# Patient Record
Sex: FEMALE | Race: WHITE | NOT HISPANIC OR LATINO | Employment: PART TIME | URBAN - METROPOLITAN AREA
[De-identification: names, ages, dates, MRNs, and addresses within clinical notes are randomized per-mention and may not be internally consistent; named-entity substitution may affect disease eponyms.]

---

## 2023-03-08 LAB — SARS-COV-2 RESULT: NOT DETECTED

## 2023-04-13 ENCOUNTER — HOSPITAL ENCOUNTER (OUTPATIENT)
Dept: DATA CONVERSION | Facility: HOSPITAL | Age: 43
End: 2023-04-13
Attending: SURGERY | Admitting: SURGERY
Payer: COMMERCIAL

## 2023-04-13 DIAGNOSIS — N61.0 MASTITIS WITHOUT ABSCESS: ICD-10-CM

## 2023-04-13 DIAGNOSIS — G89.4 CHRONIC PAIN SYNDROME: ICD-10-CM

## 2023-04-13 DIAGNOSIS — K86.2 CYST OF PANCREAS (HHS-HCC): ICD-10-CM

## 2023-04-13 DIAGNOSIS — Z90.49 ACQUIRED ABSENCE OF OTHER SPECIFIED PARTS OF DIGESTIVE TRACT: ICD-10-CM

## 2023-04-13 DIAGNOSIS — Z88.2 ALLERGY STATUS TO SULFONAMIDES: ICD-10-CM

## 2023-04-13 DIAGNOSIS — Z90.89 ACQUIRED ABSENCE OF OTHER ORGANS: ICD-10-CM

## 2023-04-13 DIAGNOSIS — C50.812 MALIGNANT NEOPLASM OF OVERLAPPING SITES OF LEFT FEMALE BREAST (MULTI): ICD-10-CM

## 2023-04-13 DIAGNOSIS — F17.200 NICOTINE DEPENDENCE, UNSPECIFIED, UNCOMPLICATED: ICD-10-CM

## 2023-05-01 LAB
COMPLETE PATHOLOGY REPORT: NORMAL
CONVERTED CLINICAL DIAGNOSIS-HISTORY: NORMAL
CONVERTED FINAL DIAGNOSIS: NORMAL
CONVERTED FINAL REPORT PDF LINK TO COPY AND PASTE: NORMAL
CONVERTED GROSS DESCRIPTION: NORMAL

## 2023-09-07 VITALS — HEIGHT: 65 IN | BODY MASS INDEX: 20.94 KG/M2 | WEIGHT: 125.66 LBS

## 2023-09-14 NOTE — H&P
"    History & Physical Reviewed:   Pregnant/Lactating:  ·  Are You Pregnant no (1)   ·  Are You Currently Breastfeeding no (1)     I have reviewed the History and Physical dated:  04-Apr-2023   History and Physical reviewed and relevant findings noted. Patient examined to review pertinent physical  findings.: No significant changes   Home Medications Reviewed: no changes noted   Allergies Reviewed: no changes noted       ERAS (Enhanced Recovery After Surgery):  ·  ERAS Patient: no     Consent:   COVID-19 Consent:  ·  COVID-19 Risk Consent Surgeon has reviewed key risks related to the risk of ganga COVID-19 and if they contract COVID-19 what the risks are.       Electronic Signatures:  Hazel Garcia)  (Signed 13-Apr-2023 09:44)   Authored: History & Physical Reviewed, ERAS, Consent,  Note Completion      Last Updated: 13-Apr-2023 09:44 by Hazel Garcia (MD)    References:  1.  Data Referenced From \"Patient Profile - Preop v3\" 13-Apr-2023 08:18   "

## 2023-09-29 ENCOUNTER — HOSPITAL ENCOUNTER (OUTPATIENT)
Dept: RADIATION ONCOLOGY | Facility: CLINIC | Age: 43
Setting detail: RADIATION/ONCOLOGY SERIES
Discharge: STILL ADMITTED OR ADMINISTRATIVE | End: 2023-09-29
Payer: COMMERCIAL

## 2023-09-29 RX ORDER — HYDROCODONE BITARTRATE AND ACETAMINOPHEN 5; 325 MG/1; MG/1
1 TABLET ORAL 4 TIMES DAILY
COMMUNITY
Start: 2023-09-22

## 2023-09-29 RX ORDER — TAMOXIFEN CITRATE 20 MG/1
TABLET ORAL
COMMUNITY
Start: 2023-07-19 | End: 2024-02-16 | Stop reason: SINTOL

## 2023-09-29 RX ORDER — VARENICLINE TARTRATE 0.5 MG/1
0.5 TABLET, FILM COATED ORAL 2 TIMES DAILY
COMMUNITY
Start: 2023-09-22

## 2023-10-02 NOTE — OP NOTE
PROCEDURE DETAILS    Preoperative Diagnosis:  Positive anterior margins status post left mastectomy for breast cancer  Postoperative Diagnosis:  Positive anterior margins status post left mastectomy for breast cancer  Surgeon: Hazel Garcia  Resident/Fellow/Other Assistant: Boubacar Moreno    Procedure:  1. Re-excision of left breast mastectomy incision    Anesthesia: Duane Naranjo  Estimated Blood Loss: 5  Findings: Normal-appearing tissue.  Specimens(s) Collected: yes,  Lateral left mastectomy wound reexcision-long stitch lateral, short stitch superior   Complications: none  IV Fluids: 900  Patient Returned To/Condition: To PACU in stable condition        Operative Report:   Indication for the procedure: Pilar is a 43-year-old white female who had previously undergone bilateral mastectomy with left sentinel lymph node biopsy for  left invasive lobular carcinoma.  She was found on final pathology to have a positive anterior margin and the breast tumor board recommended a reexcision of the lateral half of the left mastectomy wound.  The benefits and risk of this were reviewed with  the patient preoperatively.  Consent was signed.    Details of the procedure: The patient was brought to the operating room and was laid in the supine position.  After placement under general anesthesia, TONIE hose and SCDs were placed on bilateral lower extremities.  The left chest wall then was prepped  with ChloraPrep and draped in usual sterile fashion.  A pause was taken the correct patient procedure were identified.    Markings for the excision were made.  Starting just over intermediate on the mastectomy wound, approximately 2.5 cm inferiorly and 1 cm superiorly of the lateral aspect of the left mastectomy wound was marked for reexcision.  This was extended slightly beyond  the previous mastectomy wound laterally.  The incision was opened up using a 10 blade.  Using cautery, the skin and subcutaneous tissue was dissected off all  the way down to the pectoralis muscle.  The specimen was marked with a long stitch laterally  and a short stitch superiorly.  In order to close the wound, undermining was performed both inferiorly and superiorly.  0 Vicryl suture was used to reapproximate the subcutaneous tissue.  It was necessary to swing the left arm next to her body in order  to close the central aspect.  A running subcuticular stitch of 4-0 Vicryl suture then was used to reapproximate the skin.  The wound then was dressed with Steri-Strips, ABD pad and an Ace wrap.  The patient tolerated the procedure well.  She then was  awoken from general anesthesia and taken to the recovery room in stable condition.    Counts: Correct x2  Drains: None.  Complications: None.                        Attestation:   Note Completion:  Attending Attestation I was present for the entire procedure         Electronic Signatures:  Hazel Garcia)  (Signed 13-Apr-2023 11:51)   Authored: Post-Operative Note, Chart Review, Note Completion      Last Updated: 13-Apr-2023 11:51 by Hazel Garcia)

## 2023-10-03 ENCOUNTER — APPOINTMENT (OUTPATIENT)
Dept: RADIATION ONCOLOGY | Facility: CLINIC | Age: 43
End: 2023-10-03

## 2023-10-03 ENCOUNTER — HOSPITAL ENCOUNTER (OUTPATIENT)
Dept: RADIATION ONCOLOGY | Facility: CLINIC | Age: 43
Setting detail: RADIATION/ONCOLOGY SERIES
Discharge: STILL ADMITTED OR ADMINISTRATIVE | End: 2023-10-03
Payer: COMMERCIAL

## 2023-10-03 DIAGNOSIS — Z51.0 ENCOUNTER FOR ANTINEOPLASTIC RADIATION THERAPY: ICD-10-CM

## 2023-10-03 DIAGNOSIS — C50.812 MALIGNANT NEOPLASM OF OVERLAPPING SITES OF LEFT FEMALE BREAST (MULTI): ICD-10-CM

## 2023-10-03 LAB
RAD ONC MSQ ACTUAL FRACTIONS DELIVERED: 15
RAD ONC MSQ ACTUAL FRACTIONS DELIVERED: 15
RAD ONC MSQ ACTUAL SESSION DELIVERED DOSE: 266 CGRAY
RAD ONC MSQ ACTUAL SESSION DELIVERED DOSE: 266 CGRAY
RAD ONC MSQ ACTUAL TOTAL DOSE: 3990 CGRAY
RAD ONC MSQ ACTUAL TOTAL DOSE: 3990 CGRAY
RAD ONC MSQ ELAPSED DAYS: 21
RAD ONC MSQ ELAPSED DAYS: 21
RAD ONC MSQ LAST DATE: NORMAL
RAD ONC MSQ LAST DATE: NORMAL
RAD ONC MSQ PRESCRIBED FRACTIONAL DOSE: 266 CGRAY
RAD ONC MSQ PRESCRIBED FRACTIONAL DOSE: 266 CGRAY
RAD ONC MSQ PRESCRIBED NUMBER OF FRACTIONS: 16
RAD ONC MSQ PRESCRIBED NUMBER OF FRACTIONS: 16
RAD ONC MSQ PRESCRIBED TECHNIQUE: NORMAL
RAD ONC MSQ PRESCRIBED TECHNIQUE: NORMAL
RAD ONC MSQ PRESCRIBED TOTAL DOSE: 4256 CGRAY
RAD ONC MSQ PRESCRIBED TOTAL DOSE: 4256 CGRAY
RAD ONC MSQ PRESCRIPTION PATTERN COMMENT: NORMAL
RAD ONC MSQ PRESCRIPTION PATTERN COMMENT: NORMAL
RAD ONC MSQ START DATE: NORMAL
RAD ONC MSQ START DATE: NORMAL
RAD ONC MSQ TREATMENT COURSE NUMBER: 1
RAD ONC MSQ TREATMENT COURSE NUMBER: 1
RAD ONC MSQ TREATMENT SITE: NORMAL
RAD ONC MSQ TREATMENT SITE: NORMAL

## 2023-10-03 PROCEDURE — 77387 GUIDANCE FOR RADJ TX DLVR: CPT | Performed by: STUDENT IN AN ORGANIZED HEALTH CARE EDUCATION/TRAINING PROGRAM

## 2023-10-03 PROCEDURE — 77412 RADIATION TX DELIVERY LVL 3: CPT | Performed by: STUDENT IN AN ORGANIZED HEALTH CARE EDUCATION/TRAINING PROGRAM

## 2023-10-03 PROCEDURE — 77014 CHG CT GUIDANCE RADIATION THERAPY FLDS PLACEMENT: CPT | Performed by: STUDENT IN AN ORGANIZED HEALTH CARE EDUCATION/TRAINING PROGRAM

## 2023-10-04 ENCOUNTER — HOSPITAL ENCOUNTER (OUTPATIENT)
Dept: RADIATION ONCOLOGY | Facility: CLINIC | Age: 43
Setting detail: RADIATION/ONCOLOGY SERIES
Discharge: STILL ADMITTED OR ADMINISTRATIVE | End: 2023-10-04
Payer: COMMERCIAL

## 2023-10-04 DIAGNOSIS — Z51.0 ENCOUNTER FOR ANTINEOPLASTIC RADIATION THERAPY: ICD-10-CM

## 2023-10-04 DIAGNOSIS — C50.812 MALIGNANT NEOPLASM OF OVERLAPPING SITES OF LEFT FEMALE BREAST (MULTI): ICD-10-CM

## 2023-10-04 LAB
RAD ONC MSQ ACTUAL FRACTIONS DELIVERED: 16
RAD ONC MSQ ACTUAL FRACTIONS DELIVERED: 16
RAD ONC MSQ ACTUAL SESSION DELIVERED DOSE: 266 CGRAY
RAD ONC MSQ ACTUAL SESSION DELIVERED DOSE: 266 CGRAY
RAD ONC MSQ ACTUAL TOTAL DOSE: 4256 CGRAY
RAD ONC MSQ ACTUAL TOTAL DOSE: 4256 CGRAY
RAD ONC MSQ ELAPSED DAYS: 22
RAD ONC MSQ ELAPSED DAYS: 22
RAD ONC MSQ LAST DATE: NORMAL
RAD ONC MSQ LAST DATE: NORMAL
RAD ONC MSQ PRESCRIBED FRACTIONAL DOSE: 266 CGRAY
RAD ONC MSQ PRESCRIBED FRACTIONAL DOSE: 266 CGRAY
RAD ONC MSQ PRESCRIBED NUMBER OF FRACTIONS: 16
RAD ONC MSQ PRESCRIBED NUMBER OF FRACTIONS: 16
RAD ONC MSQ PRESCRIBED TECHNIQUE: NORMAL
RAD ONC MSQ PRESCRIBED TECHNIQUE: NORMAL
RAD ONC MSQ PRESCRIBED TOTAL DOSE: 4256 CGRAY
RAD ONC MSQ PRESCRIBED TOTAL DOSE: 4256 CGRAY
RAD ONC MSQ PRESCRIPTION PATTERN COMMENT: NORMAL
RAD ONC MSQ PRESCRIPTION PATTERN COMMENT: NORMAL
RAD ONC MSQ START DATE: NORMAL
RAD ONC MSQ START DATE: NORMAL
RAD ONC MSQ TREATMENT COURSE NUMBER: 1
RAD ONC MSQ TREATMENT COURSE NUMBER: 1
RAD ONC MSQ TREATMENT SITE: NORMAL
RAD ONC MSQ TREATMENT SITE: NORMAL

## 2023-10-04 PROCEDURE — 77412 RADIATION TX DELIVERY LVL 3: CPT | Performed by: STUDENT IN AN ORGANIZED HEALTH CARE EDUCATION/TRAINING PROGRAM

## 2023-10-04 PROCEDURE — 77014 CHG CT GUIDANCE RADIATION THERAPY FLDS PLACEMENT: CPT | Performed by: STUDENT IN AN ORGANIZED HEALTH CARE EDUCATION/TRAINING PROGRAM

## 2023-10-04 PROCEDURE — 77387 GUIDANCE FOR RADJ TX DLVR: CPT | Performed by: STUDENT IN AN ORGANIZED HEALTH CARE EDUCATION/TRAINING PROGRAM

## 2023-10-16 ENCOUNTER — DOCUMENTATION (OUTPATIENT)
Dept: RADIATION ONCOLOGY | Facility: CLINIC | Age: 43
End: 2023-10-16
Payer: COMMERCIAL

## 2023-10-24 DIAGNOSIS — C50.912 MALIGNANT NEOPLASM OF LEFT BREAST IN FEMALE, ESTROGEN RECEPTOR POSITIVE, UNSPECIFIED SITE OF BREAST (MULTI): ICD-10-CM

## 2023-10-24 DIAGNOSIS — Z17.0 MALIGNANT NEOPLASM OF LEFT BREAST IN FEMALE, ESTROGEN RECEPTOR POSITIVE, UNSPECIFIED SITE OF BREAST (MULTI): ICD-10-CM

## 2023-10-25 ENCOUNTER — OFFICE VISIT (OUTPATIENT)
Dept: HEMATOLOGY/ONCOLOGY | Facility: CLINIC | Age: 43
End: 2023-10-25
Payer: COMMERCIAL

## 2023-10-25 ENCOUNTER — LAB (OUTPATIENT)
Dept: LAB | Facility: HOSPITAL | Age: 43
End: 2023-10-25
Payer: COMMERCIAL

## 2023-10-25 VITALS
HEART RATE: 76 BPM | HEIGHT: 64 IN | SYSTOLIC BLOOD PRESSURE: 116 MMHG | OXYGEN SATURATION: 97 % | TEMPERATURE: 98.4 F | BODY MASS INDEX: 21.91 KG/M2 | WEIGHT: 128.35 LBS | DIASTOLIC BLOOD PRESSURE: 74 MMHG | RESPIRATION RATE: 16 BRPM

## 2023-10-25 DIAGNOSIS — C50.912 MALIGNANT NEOPLASM OF LEFT BREAST IN FEMALE, ESTROGEN RECEPTOR POSITIVE, UNSPECIFIED SITE OF BREAST (MULTI): ICD-10-CM

## 2023-10-25 DIAGNOSIS — Z17.0 MALIGNANT NEOPLASM OF LEFT BREAST IN FEMALE, ESTROGEN RECEPTOR POSITIVE, UNSPECIFIED SITE OF BREAST (MULTI): ICD-10-CM

## 2023-10-25 DIAGNOSIS — C50.919 INVASIVE LOBULAR CARCINOMA OF BREAST IN FEMALE (MULTI): ICD-10-CM

## 2023-10-25 LAB
ALBUMIN SERPL BCP-MCNC: 4.2 G/DL (ref 3.4–5)
ALP SERPL-CCNC: 75 U/L (ref 33–110)
ALT SERPL W P-5'-P-CCNC: 25 U/L (ref 7–45)
ANION GAP SERPL CALC-SCNC: 12 MMOL/L (ref 10–20)
AST SERPL W P-5'-P-CCNC: 18 U/L (ref 9–39)
BASOPHILS # BLD AUTO: 0.01 X10*3/UL (ref 0–0.1)
BASOPHILS NFR BLD AUTO: 0.2 %
BILIRUB SERPL-MCNC: 0.4 MG/DL (ref 0–1.2)
BUN SERPL-MCNC: 11 MG/DL (ref 6–23)
CALCIUM SERPL-MCNC: 9.5 MG/DL (ref 8.6–10.3)
CHLORIDE SERPL-SCNC: 100 MMOL/L (ref 98–107)
CO2 SERPL-SCNC: 29 MMOL/L (ref 21–32)
CREAT SERPL-MCNC: 0.74 MG/DL (ref 0.5–1.05)
EOSINOPHIL # BLD AUTO: 0.24 X10*3/UL (ref 0–0.7)
EOSINOPHIL NFR BLD AUTO: 5.3 %
ERYTHROCYTE [DISTWIDTH] IN BLOOD BY AUTOMATED COUNT: 11.5 % (ref 11.5–14.5)
GFR SERPL CREATININE-BSD FRML MDRD: >90 ML/MIN/1.73M*2
GLUCOSE SERPL-MCNC: 77 MG/DL (ref 74–99)
HCT VFR BLD AUTO: 42.3 % (ref 36–46)
HGB BLD-MCNC: 14 G/DL (ref 12–16)
IMM GRANULOCYTES # BLD AUTO: 0.01 X10*3/UL (ref 0–0.7)
IMM GRANULOCYTES NFR BLD AUTO: 0.2 % (ref 0–0.9)
LYMPHOCYTES # BLD AUTO: 0.64 X10*3/UL (ref 1.2–4.8)
LYMPHOCYTES NFR BLD AUTO: 14.1 %
MCH RBC QN AUTO: 28.9 PG (ref 26–34)
MCHC RBC AUTO-ENTMCNC: 33.1 G/DL (ref 32–36)
MCV RBC AUTO: 87 FL (ref 80–100)
MONOCYTES # BLD AUTO: 0.5 X10*3/UL (ref 0.1–1)
MONOCYTES NFR BLD AUTO: 11 %
NEUTROPHILS # BLD AUTO: 3.13 X10*3/UL (ref 1.2–7.7)
NEUTROPHILS NFR BLD AUTO: 69.2 %
PLATELET # BLD AUTO: 209 X10*3/UL (ref 150–450)
PMV BLD AUTO: 8.7 FL (ref 7.5–11.5)
POTASSIUM SERPL-SCNC: 3.7 MMOL/L (ref 3.5–5.3)
PROT SERPL-MCNC: 6.5 G/DL (ref 6.4–8.2)
RBC # BLD AUTO: 4.85 X10*6/UL (ref 4–5.2)
SODIUM SERPL-SCNC: 137 MMOL/L (ref 136–145)
WBC # BLD AUTO: 4.5 X10*3/UL (ref 4.4–11.3)

## 2023-10-25 PROCEDURE — 80053 COMPREHEN METABOLIC PANEL: CPT

## 2023-10-25 PROCEDURE — 99213 OFFICE O/P EST LOW 20 MIN: CPT | Performed by: INTERNAL MEDICINE

## 2023-10-25 PROCEDURE — 36415 COLL VENOUS BLD VENIPUNCTURE: CPT

## 2023-10-25 PROCEDURE — 1036F TOBACCO NON-USER: CPT | Performed by: INTERNAL MEDICINE

## 2023-10-25 PROCEDURE — 85025 COMPLETE CBC W/AUTO DIFF WBC: CPT

## 2023-10-25 ASSESSMENT — PATIENT HEALTH QUESTIONNAIRE - PHQ9
1. LITTLE INTEREST OR PLEASURE IN DOING THINGS: NOT AT ALL
2. FEELING DOWN, DEPRESSED OR HOPELESS: NOT AT ALL
SUM OF ALL RESPONSES TO PHQ9 QUESTIONS 1 AND 2: 0

## 2023-10-25 ASSESSMENT — PAIN SCALES - GENERAL: PAINLEVEL: 2

## 2023-10-25 NOTE — PROGRESS NOTES
Patient Visit Information:   Visit Type: Follow Up Visit      Cancer History:          Breast         AJCC Edition: 8th (AJCC), Diagnosis Date: Mar 2023, IB, pT3 pN0(i+) cM0 G2     Treatment Synopsis:    4/13/23, reexcision, pathology pending  ast, multifocal, maximum size 65 mm, ER 95%, CO 95%, HER2 negative, associated with lobular carcinoma in situ, posterior  anterior margin positive, status post reexcision, 1 sentinel lymph node positive for micrometastatic disease      Stage Ib, pT3, pN1(omar)Mo, MammaPrint low risk  Oncotype DX score 13     Adjuvant chemotherapy, Cytoxan and Taxotere x4     5/17/23 , c1  6/7/23  ,c2  6/28/23 , c3  7/19/23 ,c4  Status post adjuvant radiotherapy to the left chest wall    Tamoxifen 20 mg p.o. daily, started on 10/25/23     Right mastectomy, no malignancy  BRCA 1 and 2 negative               October 2022, she felt a 1 cm mass in the upper outer quadrant of the left breast. She had a mammogram which was not able to  identify the mass. She had a follow-up ultrasound showing a 1.3 cm mass of the left breast of the upper outer quadrant and a slightly  thickened axillary lymph node measuring 5 mm of cortex. She subsequently underwent an ultrasound-guided biopsy which demonstrated invasive ductal carcinoma, grade 2 which was ER/CO positive.      Given her young age, she subsequently underwent a breast MRI which demonstrated 3 masses in the left breast. A follow-up biopsy of the upper inner quadrant left breast mass   measuring 1 cm showed invasive lobular carcinoma, grade 2 which was ER/CO positive. The third breast mass on the left at the  inferior aspect could not be seen by ultrasound on Second Look evaluation. This mass could only be seen by MRI, and subsequently has not undergone a biopsy of the mass #3. She did undergo genetic testing and reports that she was BRCA negative.  Patient was evaluated by Dr. Klaus Garcia for second opinion .     3/10/23, left simple mastectomy,  left sentinel lymph node dissection and right mastectomy      Pathology of left mastectomy did show invasive lobular carcinoma, maximum size 65 mm, multifocal and as a invasive lobular carcinoma measuring 20 mm, 6 mm, 5 mm, 4 mm, 3 mm.  1 sentinel lymph node did show micrometastatic disease, associated with lobular  carcinoma in situ, anterior and posterior margin positive      4/13/23, reexcision, pathology pending                History of Present Illness:      ID Statement:    SHYANN ONOFRE is a 43 year old Female        Chief Complaint: Invasive lobular carcinoma left  breast, multifocal   Interval History:    Patient is 43-year-old premenopausal, she evaluated for invasive lobular carcinoma left breast status post bilateral mastectomy.  Tumor measuring 65 mm, pT3, 1 lymph  node positive for micrometastatic disease, ER positive NH positive, grade 2.            October 2022, she felt a 1 cm mass in the upper outer quadrant of the left breast. She had a mammogram which was not able to  identify the mass. She had a follow-up ultrasound showing a 1.3 cm mass of the left breast of the upper outer quadrant and a slightly  thickened axillary lymph node measuring 5 mm of cortex. She subsequently underwent an ultrasound-guided biopsy which demonstrated invasive ductal carcinoma, grade 2 which was ER/NH positive.      Given her young age, she subsequently underwent a breast MRI which demonstrated 3 masses in the left breast. A follow-up biopsy of the upper inner quadrant left breast mass   measuring 1 cm showed invasive lobular carcinoma, grade 2 which was ER/NH positive. The third breast mass on the left at the  inferior aspect could not be seen by ultrasound on Second Look evaluation. This mass could only be seen by MRI, and subsequently has not undergone a biopsy of the mass #3. She did undergo genetic testing and reports that she was BRCA negative.  Patient was evaluated by Dr. Klaus Garcia for second opinion .      3/10/23, left simple mastectomy, left sentinel lymph node dissection and right mastectomy      Pathology of left mastectomy did show invasive lobular carcinoma, maximum size 65 mm, multifocal and as a invasive lobular carcinoma measuring 20 mm, 6 mm, 5 mm, 4 mm, 3 mm.  1 sentinel lymph node did show micrometastatic disease, associated with lobular  carcinoma in situ, anterior and posterior margin positive      4/13/23, reexcision, pathology pending      Medical oncology consultation is requested for systemic treatment      Patient is very anxious, no headache, no bony pain, no weight loss still very active, complaining left chest wall pain.  Status post partial hysterectomy patient still has ovaries      Pathology report and possible prognostic factor including tumor size, ER/MD status, HER2/elisabeth status discussed with the patient.     7/19/23     Patient has a history of left breast cancer, status post bilateral mastectomy, BRCA1 negative, low MammaPrint, Oncotype DX score 13 and lab discussed with the patient.  Patient is very anxious no any other complaint.     Patient receiving adjuvant chemotherapy including Cytoxan and Taxotere which was started      10/25/23  Patient has a history of left breast cancer undergo bilateral mastectomy status post  TC x 4 and status post radiotherapy.  Patient is complaining left chest wall bony pain, energy level is coming back something patient is feeling numbness tingling lower extremity.  So far patient did not start tamoxifen she is going to start tamoxifen today.         Review of Systems:   Review of Systems:    Patient is anxious, no headache, no dizziness, no bony pain, no chest pain and shortness of breath, left chest wall pain, no nausea vomiting, no abdominal pain, status  post partial hysterectomy, patient still has ovaries        Allergies and Intolerances:       Allergies:         Phenergan: Drug, Unknown, Active         sulfa drugs: Drug Category, Unknown, Active      Outpatient Medication Profile:  * Patient Currently Takes Medications as of 2023 09:01 documented in Structured Notes         nystatin 100,000 units/mL oral suspension : 4 milliliter(s) orally 4 times a day  Swish in mouth for several seconds and swallow , Start Date: 2023         ondansetron 8 mg oral tablet, disintegratin tab(s) orally every 6  hours, As Needed  -for nausea and vomiting , Start Date: 2023         Diflucan 100 mg oral tablet: 1 tab(s) orally once a day x 7 days , Start  Date: 2023         dexAMETHasone 4 mg oral tablet: 2 tab(s) orally 2 times a day the day  prior and the 2 days after each chemotherpay treatment , Start Date: 04-May-2023         Norco 325 mg-5 mg oral tablet: 1 cap(s) oral prn         Tums 500 mg oral tablet, chewable: 1 cap(s) oral prn         Motrin: 1 cap(s) oral prn         Chantix: 1 cap(s) oral once a day         Vitamin C 1000 mg oral tablet: 1 tab(s) orally once a day         multivitamin:         Family History: No Family History items are recorded  in the problem list.      Social History:   Social Substance History:  ·  Smoking Status former smoker (1)   ·  Drug Use unknown            Vitals and Measurements:   Vitals: Temp: 36.8  HR: 79  RR: 16  BP: 122/80  SPO2%:   98   Measurements: HT(cm): 162.7  WT(kg): 57.8  BSA: 1.61   BMI:  21.8   Last 3 Weights & Heights: Date:                           Weight/Scale Type:                    Height:   2023 12:12                57.8  kg                     162.7  cm  2023 12:10                58.7  kg                     162.7  cm  2023 12:13                57.5  kg                     162.7  cm      Physical Exam:      Constitutional: awake/alert/oriented x3, no distress,   Head/Neck: Neck supple, no apparent injury, thyroid  without mass or tenderness, No JVD, trachea midline, no bruits   Respiratory/Thorax: Patent airways, CTAB, normal  breath sounds   Cardiovascular:  Regular, rate and rhythm,  normal  S 1and S 2   Gastrointestinal: Nondistended, soft, non-tender,  no rebound tenderness or guarding, no masses palpable, no organomegaly, +BS,   Musculoskeletal: ROM intact, no joint swelling,   Extremities: normal extremities, no cyanosis edema,  contusions or wounds, no clubbing   Neurological: alert and oriented x3, intact senses,  motor, response and reflexes, normal strength   Breast: Bilateral   mastectomy, scar is healing   Lymphatic: No significant lymphadenopathy   Psychological: Appropriate mood and behavior   Skin: Warm and dry, no lesions, no rashes         Lab Results:                3.8             104             N/A             12              0.68     Assessment and Plan:      Assessment and Plan:   Assessment:    1.  Invasive lobular carcinoma left breast, multifocal, maximum size 65 mm, ER 95%, CT 95%, HER2 negative, associated with lobular carcinoma in situ, posterior anterior  margin positive, status post reexcision, 1 sentinel lymph node positive for micrometastatic disease      Stage Ib, pT3, pN1(omar)Mo, MammaPrint low risk , Oncotype DX score 13     Right mastectomy, no malignancy  BRCA 1 and 2 negative     Treatment, Cytoxan, Taxotere      C1, 5/17/23  C2 , 6/07/23   C3 ,6/28/23 7/19/23 , C4  Status post radiotherapy         October 2022, she felt a 1 cm mass in the upper outer quadrant of the left breast. She had a mammogram which was not able to  identify the mass. She had a follow-up ultrasound showing a 1.3 cm mass of the left breast of the upper outer quadrant and a slightly  thickened axillary lymph node measuring 5 mm of cortex. She subsequently underwent an ultrasound-guided biopsy which demonstrated invasive ductal carcinoma, grade 2 which was ER/CT positive.      Given her young age, she subsequently underwent a breast MRI which demonstrated 3 masses in the left breast. A follow-up biopsy of the upper inner quadrant left breast mass   measuring 1  cm showed invasive lobular carcinoma, grade 2 which was ER/MS positive. The third breast mass on the left at the  inferior aspect could not be seen by ultrasound on Second Look evaluation. This mass could only be seen by MRI, and subsequently has not undergone a biopsy of the mass #3. She did undergo genetic testing and reports that she was BRCA negative.  Patient was evaluated by Dr. Klaus Garcia for second opinion .     3/10/23, left simple mastectomy, left sentinel lymph node dissection and right mastectomy      Pathology of left mastectomy did show invasive lobular carcinoma, maximum size 65 mm, multifocal and as a invasive lobular carcinoma measuring 20 mm, 6 mm, 5 mm, 4 mm, 3 mm.  1 sentinel lymph node did show micrometastatic disease, associated with lobular  carcinoma in situ, anterior and posterior margin positive      4/13/23, reexcision, pathology pending         Pathology and possible prognostic factors are discussed with the patient.  Patient has stage Ib invasive lobular carcinoma left breast, multifocal, measuring 65 mm ER positive, MS positive, HER2 negative, 1 lymph node positive for micrometastatic disease.  Patient has T3 disease ,  will be treated with systemic chemotherapy including Cytoxan and Taxotere, even though MammaPrint is low I will also check Oncotype DX study to make sure patient does not need doxorubicin based chemotherapy.  After chemotherapy  patient will be treated with adjuvant radiotherapy followed by tamoxifen 20 mg p.o. daily.      10/25/23     Patient has a history of left   breast cancer T3 N1 M0, MammaPrint low risk, Oncotype DX 13 status post 3 doses of adjuvant chemotherapy including Cytoxan and Taxotere which is well-tolerated.   Patient also has received adjuvant radiotherapy was finished 2 weeks ago.  Patient did not started tamoxifen yet when to start tamoxifen 20 mg p.o. daily today.    Possible side effect discussed with the patient.    Follow-up after 2-month,  as the patient has persistent left rib pain I will schedule for bone scan.     Time spent 30 minutes

## 2023-10-31 NOTE — RADIATION COMPLETION NOTES
Radiation Oncology Treatment Summary    Patient Name:  Pilar Khan  MRN:  [id number]  :  1980    Referring Provider: No ref. provider found  Primary Care Provider: Jv Garrett MD    Brief History: Pilar Khan is a 43 y.o. female with No matching staging information was found for the patient..  The patient completed radiotherapy as outlined below.    Radiation Treatment Summary:    Radiation Treatments       Lt SCV/Ailla     42.56 Gy / 16 fx 3D     2023 - 10/04/2023        Lt CW/IMNs    42.56 Gy / 16 fx 3D     2023 - 10/04/2023       Concurrent Chemotherapy:  Treatment Plans       Patient tolerated treatment well with anticipated side effects. During the course of treatment, she developed erythema to the treatment area, no moist desquamation and mild sore throat          CTCAE Toxicity Overview:     Patient Disposition: Patient will be seen for follow up on 2023. She knows to call with any questions or concerns.

## 2023-11-07 ENCOUNTER — APPOINTMENT (OUTPATIENT)
Dept: RADIATION ONCOLOGY | Facility: CLINIC | Age: 43
End: 2023-11-07
Payer: COMMERCIAL

## 2023-11-09 ENCOUNTER — APPOINTMENT (OUTPATIENT)
Dept: RADIATION ONCOLOGY | Facility: CLINIC | Age: 43
End: 2023-11-09
Payer: COMMERCIAL

## 2023-11-17 ENCOUNTER — HOSPITAL ENCOUNTER (OUTPATIENT)
Dept: RADIATION ONCOLOGY | Facility: CLINIC | Age: 43
Setting detail: RADIATION/ONCOLOGY SERIES
Discharge: HOME | End: 2023-11-17
Payer: COMMERCIAL

## 2023-11-17 VITALS
WEIGHT: 123.5 LBS | DIASTOLIC BLOOD PRESSURE: 81 MMHG | SYSTOLIC BLOOD PRESSURE: 118 MMHG | OXYGEN SATURATION: 98 % | TEMPERATURE: 97.5 F | BODY MASS INDEX: 21.19 KG/M2 | RESPIRATION RATE: 18 BRPM | HEART RATE: 82 BPM

## 2023-11-17 DIAGNOSIS — Z17.0 MALIGNANT NEOPLASM OF OVERLAPPING SITES OF LEFT BREAST IN FEMALE, ESTROGEN RECEPTOR POSITIVE (MULTI): Primary | ICD-10-CM

## 2023-11-17 DIAGNOSIS — C50.812 MALIGNANT NEOPLASM OF OVERLAPPING SITES OF LEFT BREAST IN FEMALE, ESTROGEN RECEPTOR POSITIVE (MULTI): Primary | ICD-10-CM

## 2023-11-17 PROCEDURE — 99214 OFFICE O/P EST MOD 30 MIN: CPT | Performed by: STUDENT IN AN ORGANIZED HEALTH CARE EDUCATION/TRAINING PROGRAM

## 2023-11-17 ASSESSMENT — COLUMBIA-SUICIDE SEVERITY RATING SCALE - C-SSRS
1. IN THE PAST MONTH, HAVE YOU WISHED YOU WERE DEAD OR WISHED YOU COULD GO TO SLEEP AND NOT WAKE UP?: NO
2. HAVE YOU ACTUALLY HAD ANY THOUGHTS OF KILLING YOURSELF?: NO
6. HAVE YOU EVER DONE ANYTHING, STARTED TO DO ANYTHING, OR PREPARED TO DO ANYTHING TO END YOUR LIFE?: NO

## 2023-11-17 ASSESSMENT — ENCOUNTER SYMPTOMS
DEPRESSION: 0
LOSS OF SENSATION IN FEET: 0
OCCASIONAL FEELINGS OF UNSTEADINESS: 0

## 2023-11-17 ASSESSMENT — LIFESTYLE VARIABLES
SKIP TO QUESTIONS 9-10: 1
HOW OFTEN DO YOU HAVE SIX OR MORE DRINKS ON ONE OCCASION: NEVER
AUDIT-C TOTAL SCORE: 0
HOW MANY STANDARD DRINKS CONTAINING ALCOHOL DO YOU HAVE ON A TYPICAL DAY: PATIENT DOES NOT DRINK
HOW OFTEN DO YOU HAVE A DRINK CONTAINING ALCOHOL: NEVER

## 2023-11-17 ASSESSMENT — PATIENT HEALTH QUESTIONNAIRE - PHQ9
2. FEELING DOWN, DEPRESSED OR HOPELESS: NOT AT ALL
1. LITTLE INTEREST OR PLEASURE IN DOING THINGS: NOT AT ALL
SUM OF ALL RESPONSES TO PHQ9 QUESTIONS 1 AND 2: 0

## 2023-11-17 ASSESSMENT — PAIN SCALES - GENERAL: PAINLEVEL: 2

## 2023-11-17 NOTE — PROGRESS NOTES
Radiation Oncology Follow-Up    Patient Name:  Pilar Khan  MRN:  [id number]  :  1980    Referring Provider: No ref. provider found  Primary Care Provider: Jv Garrett MD  Care Team: Patient Care Team:  Jv Garrett MD as PCP - General  Shirley Deleon MD as Consulting Physician (Hematology and Oncology)    Date of Service: 2023     SUBJECTIVE  History of Present Illness:   Pilar Khan is a 43 y.o. female who was previously seen at the [facility] Department of Radiation Oncology for anatomic stage IIIA prognostic stage IB mpT3 pN1mi cM0 left breast invasive lobular carcinoma (grade 2, ER+/OK+/HER2-, Oncotype 13). S/p left mastectomy/SLNB with positive margin and right prophylactic mastectomy on 3/9/23, then left re-excision 23 with no residual disease. She received adjuvant TC x4 completed 23. She received left PMRT 42.56 Gy/16 fractions completed 10/4/23.    Today, she returns for post-treatment follow up. She had skin desquamation over the left chest that has now healed and she denies skin irritation. She is having chest wall pain just inferior to the left mastectomy incision, which she has been dealing with since before she started adjuvant chemo. The pain bothers her more at night and only occasionally during the day with certain arm positions. She denies headache, vision changes, or focal weakness. She has neuropathy in her feet. She is having some intermittent nausea, some fatigue that is improving, and low appetite. She has been working part time at a grocery store, completes all ADLs at home, and is taking care of her 3 kids. She has tamoxifen but has not started taking it yet.    Treatment Rendered:   Radiation Treatments       Active   No active radiation treatments to show.     Historical   Lt CW/IMNs (Started on 2023)   Most recent fraction: 266 cGy given on 10/4/2023   Total given: 4,256 cGy / 4,256 cGy  (16 of 16 fractions)    Elapsed Days: 22   Technique: 3D        Lt SCV/Axilla (Started on 9/12/2023)   Most recent fraction: 266 cGy given on 10/4/2023   Total given: 4,256 cGy / 4,256 cGy  (16 of 16 fractions)   Elapsed Days: 22   Technique: 3D                     Review of Systems:   Review of Systems - Oncology    Performance Status:   The Karnofsky performance scale today is 90, Able to carry on normal activity; minor signs or symptoms of disease (ECOG equivalent 0).        OBJECTIVE  Vital Signs:  /81 (BP Location: Right arm, Patient Position: Sitting, BP Cuff Size: Adult)   Pulse 82   Temp 36.4 °C (97.5 °F) (Temporal)   Resp 18   Wt 56 kg (123 lb 8 oz)   SpO2 98%   BMI 21.19 kg/m²    Physical Exam:  Physical Exam  Vitals reviewed.   Constitutional:       General: She is not in acute distress.     Appearance: Normal appearance.   HENT:      Head: Normocephalic and atraumatic.      Mouth/Throat:      Mouth: Mucous membranes are moist.      Pharynx: Oropharynx is clear. No oropharyngeal exudate or posterior oropharyngeal erythema.   Eyes:      General: No scleral icterus.     Extraocular Movements: Extraocular movements intact.      Conjunctiva/sclera: Conjunctivae normal.      Pupils: Pupils are equal, round, and reactive to light.   Pulmonary:      Effort: Pulmonary effort is normal. No respiratory distress.   Chest:   Breasts:     Right: Absent. No swelling, bleeding, inverted nipple, mass, nipple discharge, skin change or tenderness.      Left: Tenderness present. No swelling, bleeding, inverted nipple, mass, nipple discharge or skin change.      Comments: Bilateral mastectomy incisions intact and well-healed. No suspicious chest wall nodules or masses bilaterally. There are two areas of tenderness inferior to the left mastectomy incision, one more medially and one more laterally. There are no associated masses or skin lesions.  Musculoskeletal:         General: Normal range of motion.      Cervical back: Normal range  of motion and neck supple.      Right lower leg: No edema.      Left lower leg: No edema.   Lymphadenopathy:      Upper Body:      Right upper body: No supraclavicular, axillary or pectoral adenopathy.      Left upper body: No supraclavicular, axillary or pectoral adenopathy.   Skin:     General: Skin is warm and dry.      Coloration: Skin is not jaundiced.      Findings: No lesion or rash.   Neurological:      General: No focal deficit present.      Mental Status: She is alert and oriented to person, place, and time.      Cranial Nerves: No cranial nerve deficit.      Sensory: No sensory deficit.      Motor: No weakness.      Coordination: Coordination normal.      Gait: Gait normal.   Psychiatric:         Mood and Affect: Mood normal.         Behavior: Behavior normal.             ASSESSMENT:  Pilar Khan is a 43 y.o. female with anatomic stage IIIA prognostic stage IB mpT3 pN1mi cM0 left breast invasive lobular carcinoma (grade 2, ER+/ND+/HER2-, Oncotype 13). S/p left mastectomy/SLNB with positive margin and right prophylactic mastectomy on 3/9/23, then left re-excision 4/13/23 with no residual disease. She received adjuvant TC x4 completed 7/19/23. She received left PMRT 42.56 Gy/16 fractions completed 10/4/23.    She is recovering well from her acute radiation side effects, and she maintains excellent performance status (ECOG 0). She is working part time and taking care of her children at home. She is still dealing with some fatigue, neuropathy in her feet, lower appetite, and some left chest wall pain that has persisted since after her surgeries. She has not yet started tamoxifen. She has no evidence of disease on exam today.    She is going to follow up with medical oncology next month and will see her surgeon in 3/2024. I will see her for follow up in about 6 months. I will refer her to occupational therapy for her chronic mastectomy-related chest wall pain.     NCCN Guidelines were applicable to guide  this patients treatment plan.    Aaron Dye MD

## 2023-12-10 DIAGNOSIS — C50.919 INVASIVE LOBULAR CARCINOMA OF BREAST IN FEMALE (MULTI): Primary | ICD-10-CM

## 2023-12-11 ENCOUNTER — LAB (OUTPATIENT)
Dept: LAB | Facility: HOSPITAL | Age: 43
End: 2023-12-11
Payer: COMMERCIAL

## 2023-12-11 ENCOUNTER — OFFICE VISIT (OUTPATIENT)
Dept: HEMATOLOGY/ONCOLOGY | Facility: CLINIC | Age: 43
End: 2023-12-11
Payer: COMMERCIAL

## 2023-12-11 VITALS
HEART RATE: 79 BPM | RESPIRATION RATE: 16 BRPM | WEIGHT: 129.63 LBS | SYSTOLIC BLOOD PRESSURE: 120 MMHG | DIASTOLIC BLOOD PRESSURE: 84 MMHG | HEIGHT: 64 IN | BODY MASS INDEX: 22.13 KG/M2 | OXYGEN SATURATION: 99 % | TEMPERATURE: 98.1 F

## 2023-12-11 DIAGNOSIS — C50.919 INVASIVE LOBULAR CARCINOMA OF BREAST IN FEMALE (MULTI): ICD-10-CM

## 2023-12-11 LAB
ALBUMIN SERPL BCP-MCNC: 4 G/DL (ref 3.4–5)
ALP SERPL-CCNC: 59 U/L (ref 33–110)
ALT SERPL W P-5'-P-CCNC: 15 U/L (ref 7–45)
ANION GAP SERPL CALC-SCNC: 13 MMOL/L (ref 10–20)
AST SERPL W P-5'-P-CCNC: 15 U/L (ref 9–39)
BASOPHILS # BLD AUTO: 0.05 X10*3/UL (ref 0–0.1)
BASOPHILS NFR BLD AUTO: 0.8 %
BILIRUB SERPL-MCNC: 0.6 MG/DL (ref 0–1.2)
BUN SERPL-MCNC: 12 MG/DL (ref 6–23)
CALCIUM SERPL-MCNC: 9 MG/DL (ref 8.6–10.3)
CHLORIDE SERPL-SCNC: 104 MMOL/L (ref 98–107)
CO2 SERPL-SCNC: 26 MMOL/L (ref 21–32)
CREAT SERPL-MCNC: 0.74 MG/DL (ref 0.5–1.05)
EOSINOPHIL # BLD AUTO: 0.46 X10*3/UL (ref 0–0.7)
EOSINOPHIL NFR BLD AUTO: 7.4 %
ERYTHROCYTE [DISTWIDTH] IN BLOOD BY AUTOMATED COUNT: 12.7 % (ref 11.5–14.5)
GFR SERPL CREATININE-BSD FRML MDRD: >90 ML/MIN/1.73M*2
GLUCOSE SERPL-MCNC: 87 MG/DL (ref 74–99)
HCT VFR BLD AUTO: 42.7 % (ref 36–46)
HGB BLD-MCNC: 14.2 G/DL (ref 12–16)
IMM GRANULOCYTES # BLD AUTO: 0.01 X10*3/UL (ref 0–0.7)
IMM GRANULOCYTES NFR BLD AUTO: 0.2 % (ref 0–0.9)
LYMPHOCYTES # BLD AUTO: 0.64 X10*3/UL (ref 1.2–4.8)
LYMPHOCYTES NFR BLD AUTO: 10.3 %
MCH RBC QN AUTO: 28.8 PG (ref 26–34)
MCHC RBC AUTO-ENTMCNC: 33.3 G/DL (ref 32–36)
MCV RBC AUTO: 87 FL (ref 80–100)
MONOCYTES # BLD AUTO: 0.51 X10*3/UL (ref 0.1–1)
MONOCYTES NFR BLD AUTO: 8.2 %
NEUTROPHILS # BLD AUTO: 4.54 X10*3/UL (ref 1.2–7.7)
NEUTROPHILS NFR BLD AUTO: 73.1 %
PLATELET # BLD AUTO: 243 X10*3/UL (ref 150–450)
POTASSIUM SERPL-SCNC: 3.9 MMOL/L (ref 3.5–5.3)
PROT SERPL-MCNC: 6.7 G/DL (ref 6.4–8.2)
RBC # BLD AUTO: 4.93 X10*6/UL (ref 4–5.2)
SODIUM SERPL-SCNC: 139 MMOL/L (ref 136–145)
WBC # BLD AUTO: 6.2 X10*3/UL (ref 4.4–11.3)

## 2023-12-11 PROCEDURE — 99214 OFFICE O/P EST MOD 30 MIN: CPT | Performed by: INTERNAL MEDICINE

## 2023-12-11 PROCEDURE — 80053 COMPREHEN METABOLIC PANEL: CPT | Performed by: INTERNAL MEDICINE

## 2023-12-11 PROCEDURE — 36415 COLL VENOUS BLD VENIPUNCTURE: CPT | Performed by: INTERNAL MEDICINE

## 2023-12-11 PROCEDURE — 85025 COMPLETE CBC W/AUTO DIFF WBC: CPT

## 2023-12-11 PROCEDURE — 1036F TOBACCO NON-USER: CPT | Performed by: INTERNAL MEDICINE

## 2023-12-11 ASSESSMENT — PAIN SCALES - GENERAL: PAINLEVEL: 2

## 2023-12-11 ASSESSMENT — PATIENT HEALTH QUESTIONNAIRE - PHQ9
2. FEELING DOWN, DEPRESSED OR HOPELESS: NOT AT ALL
SUM OF ALL RESPONSES TO PHQ9 QUESTIONS 1 AND 2: 0
1. LITTLE INTEREST OR PLEASURE IN DOING THINGS: NOT AT ALL

## 2023-12-11 NOTE — PATIENT INSTRUCTIONS
Continue tamoxifen    Follow up in 3 months after having bone scan.     Lab appointments are no longer scheduled. Please arrive at least 15 minutes before scheduled appointment time for blood work.

## 2023-12-11 NOTE — PROGRESS NOTES
Patient Visit Information:   Visit Type: Follow Up Visit      Cancer History:          Breast         AJCC Edition: 8th (AJCC), Diagnosis Date: Mar 2023, IB, pT3 pN0(i+) cM0 G2     Treatment Synopsis:    4/13/23, reexcision, pathology pending  ast, multifocal, maximum size 65 mm, ER 95%, AR 95%, HER2 negative, associated with lobular carcinoma in situ, posterior  anterior margin positive, status post reexcision, 1 sentinel lymph node positive for micrometastatic disease      Stage Ib, pT3, pN1(omar)Mo, MammaPrint low risk  Oncotype DX score 13     Adjuvant chemotherapy, Cytoxan and Taxotere x4     5/17/23 , c1  6/7/23  ,c2  6/28/23 , c3  7/19/23 ,c4  Status post adjuvant radiotherapy to the left chest wall    Tamoxifen 20 mg p.o. daily, started on 10/25/23, patient did not start tamoxifen     Right mastectomy, no malignancy  BRCA 1 and 2 negative               October 2022, she felt a 1 cm mass in the upper outer quadrant of the left breast. She had a mammogram which was not able to  identify the mass. She had a follow-up ultrasound showing a 1.3 cm mass of the left breast of the upper outer quadrant and a slightly  thickened axillary lymph node measuring 5 mm of cortex. She subsequently underwent an ultrasound-guided biopsy which demonstrated invasive ductal carcinoma, grade 2 which was ER/AR positive.      Given her young age, she subsequently underwent a breast MRI which demonstrated 3 masses in the left breast. A follow-up biopsy of the upper inner quadrant left breast mass   measuring 1 cm showed invasive lobular carcinoma, grade 2 which was ER/AR positive. The third breast mass on the left at the  inferior aspect could not be seen by ultrasound on Second Look evaluation. This mass could only be seen by MRI, and subsequently has not undergone a biopsy of the mass #3. She did undergo genetic testing and reports that she was BRCA negative.  Patient was evaluated by Dr. Klaus Garcia for second opinion .      3/10/23, left simple mastectomy, left sentinel lymph node dissection and right mastectomy      Pathology of left mastectomy did show invasive lobular carcinoma, maximum size 65 mm, multifocal and as a invasive lobular carcinoma measuring 20 mm, 6 mm, 5 mm, 4 mm, 3 mm.  1 sentinel lymph node did show micrometastatic disease, associated with lobular  carcinoma in situ, anterior and posterior margin positive      4/13/23, reexcision, no malignancy               History of Present Illness:      ID Statement:    SHYANN ONOFRE is a 43 year old Female        Chief Complaint: Invasive lobular carcinoma left  breast, multifocal   Interval History:    Patient is 43-year-old premenopausal, she evaluated for invasive lobular carcinoma left breast status post bilateral mastectomy.  Tumor measuring 65 mm, pT3, 1 lymph  node positive for micrometastatic disease, ER positive W[place] positive, grade 2.            October 2022, she felt a 1 cm mass in the upper outer quadrant of the left breast. She had a mammogram which was not able to  identify the mass. She had a follow-up ultrasound showing a 1.3 cm mass of the left breast of the upper outer quadrant and a slightly  thickened axillary lymph node measuring 5 mm of cortex. She subsequently underwent an ultrasound-guided biopsy which demonstrated invasive ductal carcinoma, grade 2 which was ER/WY positive.      Given her young age, she subsequently underwent a breast MRI which demonstrated 3 masses in the left breast. A follow-up biopsy of the upper inner quadrant left breast mass   measuring 1 cm showed invasive lobular carcinoma, grade 2 which was ER/WY positive. The third breast mass on the left at the  inferior aspect could not be seen by ultrasound on Second Look evaluation. This mass could only be seen by MRI, and subsequently has not undergone a biopsy of the mass #3. She did undergo genetic testing and reports that she was BRCA negative.  Patient was evaluated by Dr. Enrique   Jose for second opinion .     3/10/23, left simple mastectomy, left sentinel lymph node dissection and right mastectomy      Pathology of left mastectomy did show invasive lobular carcinoma, maximum size 65 mm, multifocal and as a invasive lobular carcinoma measuring 20 mm, 6 mm, 5 mm, 4 mm, 3 mm.  1 sentinel lymph node did show micrometastatic disease, associated with lobular  carcinoma in situ, anterior and posterior margin positive      4/13/23, reexcision, pathology pending      Medical oncology consultation is requested for systemic treatment      Patient is very anxious, no headache, no bony pain, no weight loss still very active, complaining left chest wall pain.  Status post partial hysterectomy patient still has ovaries      Pathology report and possible prognostic factor including tumor size, ER/NM status, HER2/elisabeth status discussed with the patient.    12/11/23    Patient has a history of left breast cancer undergo bilateral mastectomy status post  TC x 4 and status post radiotherapy.  Patient is complaining left chest wall bony pain, energy level is coming back something patient is feeling numbness tingling lower extremity.  So far patient did not start tamoxifen .     Review of Systems:   Review of Systems:    Patient is anxious, no headache, no dizziness, no bony pain, no chest pain and shortness of breath, left chest wall pain, no nausea vomiting, no abdominal pain, status  post partial hysterectomy, patient still has ovaries        Allergies and Intolerances:       Allergies:         Phenergan: Drug, Unknown, Active         sulfa drugs: Drug Category, Unknown, Active     Outpatient Medication Profile:  * Patient Currently Takes Medications as of 18-Jul-2023 09:01 documented in Structured Notes         nystatin 100,000 units/mL oral suspension : 4 milliliter(s) orally 4 times a day  Swish in mouth for several seconds and swallow , Start Date: 26-Jun-2023         ondansetron 8 mg oral tablet,  disintegratin tab(s) orally every 6  hours, As Needed  -for nausea and vomiting , Start Date: 2023         Diflucan 100 mg oral tablet: 1 tab(s) orally once a day x 7 days , Start  Date: 2023         dexAMETHasone 4 mg oral tablet: 2 tab(s) orally 2 times a day the day  prior and the 2 days after each chemotherpay treatment , Start Date: 04-May-2023         Norco 325 mg-5 mg oral tablet: 1 cap(s) oral prn         Tums 500 mg oral tablet, chewable: 1 cap(s) oral prn         Motrin: 1 cap(s) oral prn         Chantix: 1 cap(s) oral once a day         Vitamin C 1000 mg oral tablet: 1 tab(s) orally once a day         multivitamin:         Family History: No Family History items are recorded  in the problem list.      Social History:   Social Substance History:  ·  Smoking Status former smoker (1)   ·  Drug Use unknown            Vitals and Measurements:   Vitals: Temp: 36.8  HR: 79  RR: 16  BP: 122/80  SPO2%:   98   Measurements: HT(cm): 162.7  WT(kg): 57.8  BSA: 1.61   BMI:  21.8   Last 3 Weights & Heights: Date:                           Weight/Scale Type:                    Height:   2023 12:12                57.8  kg                     162.7  cm  2023 12:10                58.7  kg                     162.7  cm  2023 12:13                57.5  kg                     162.7  cm      Physical Exam:      Constitutional: awake/alert/oriented x3, no distress,   Head/Neck: Neck supple, no apparent injury, thyroid  without mass or tenderness, No JVD, trachea midline, no bruits   Respiratory/Thorax: Patent airways, CTAB, normal  breath sounds   Cardiovascular: Regular, rate and rhythm,  normal  S 1and S 2   Gastrointestinal: Nondistended, soft, non-tender,  no rebound tenderness or guarding, no masses palpable, no organomegaly, +BS,   Musculoskeletal: ROM intact, no joint swelling,   Extremities: normal extremities, no cyanosis edema,  contusions or wounds, no clubbing   Neurological:  alert and oriented x3, intact senses,  motor, response and reflexes, normal strength   Breast: Bilateral   mastectomy, scar is healing   Lymphatic: No significant lymphadenopathy   Psychological: Appropriate mood and behavior   Skin: Warm and dry, no lesions, no rashes         Lab Results:                3.8             104             N/A             12              0.68     Assessment and Plan:      Assessment and Plan:   Assessment:    1.  Invasive lobular carcinoma left breast, multifocal, maximum size 65 mm, ER 95%, NM 95%, HER2 negative, associated with lobular carcinoma in situ, posterior anterior  margin positive, status post reexcision, 1 sentinel lymph node positive for micrometastatic disease      Stage Ib, pT3, pN1(omar)Mo, MammaPrint low risk , Oncotype DX score 13     Right mastectomy, no malignancy  BRCA 1 and 2 negative     Treatment, Cytoxan, Taxotere      C1, 5/17/23  C2 , 6/07/23   C3 ,6/28/23 7/19/23 , C4  Status post radiotherapy         October 2022, she felt a 1 cm mass in the upper outer quadrant of the left breast. She had a mammogram which was not able to  identify the mass. She had a follow-up ultrasound showing a 1.3 cm mass of the left breast of the upper outer quadrant and a slightly  thickened axillary lymph node measuring 5 mm of cortex. She subsequently underwent an ultrasound-guided biopsy which demonstrated invasive ductal carcinoma, grade 2 which was ER/NM positive.      Given her young age, she subsequently underwent a breast MRI which demonstrated 3 masses in the left breast. A follow-up biopsy of the upper inner quadrant left breast mass   measuring 1 cm showed invasive lobular carcinoma, grade 2 which was ER/NM positive. The third breast mass on the left at the  inferior aspect could not be seen by ultrasound on Second Look evaluation. This mass could only be seen by MRI, and subsequently has not undergone a biopsy of the mass #3. She did undergo genetic testing and reports  that she was BRCA negative.  Patient was evaluated by Dr. Klaus Garcia for second opinion .     3/10/23, left simple mastectomy, left sentinel lymph node dissection and right mastectomy      Pathology of left mastectomy did show invasive lobular carcinoma, maximum size 65 mm, multifocal and as a invasive lobular carcinoma measuring 20 mm, 6 mm, 5 mm, 4 mm, 3 mm.  1 sentinel lymph node did show micrometastatic disease, associated with lobular  carcinoma in situ, anterior and posterior margin positive      4/13/23, reexcision, pathology pending         Pathology and possible prognostic factors are discussed with the patient.  Patient has stage Ib invasive lobular carcinoma left breast, multifocal, measuring 65 mm ER positive, MS positive, HER2 negative, 1 lymph node positive for micrometastatic disease.  Patient has T3 disease ,  will be treated with systemic chemotherapy including Cytoxan and Taxotere, even though MammaPrint is low I will also check Oncotype DX study to make sure patient does not need doxorubicin based chemotherapy.  After chemotherapy  patient will be treated with adjuvant radiotherapy followed by tamoxifen 20 mg p.o. daily.      12/11/23     Patient has a history of left   breast cancer T3 N1 M0, MammaPrint low risk, Oncotype DX 13 status post 3 doses of adjuvant chemotherapy including Cytoxan and Taxotere which is well-tolerated.   Patient also has received adjuvant radiotherapy was finished 2 weeks ago.  Patient did not started tamoxifen yet .    Patient is complaining of severe left rib pain, no swelling, scheduled for bone scan follow-up after 3 months.    I also suggested to start tamoxifen patient promised today to start tamoxifen but she also told me she is afraid to start tamoxifen     Time spent 30 minutes

## 2023-12-13 ENCOUNTER — APPOINTMENT (OUTPATIENT)
Dept: OPHTHALMOLOGY | Facility: CLINIC | Age: 43
End: 2023-12-13
Payer: COMMERCIAL

## 2023-12-29 ENCOUNTER — HOSPITAL ENCOUNTER (OUTPATIENT)
Dept: RADIOLOGY | Facility: HOSPITAL | Age: 43
Discharge: HOME | End: 2023-12-29
Payer: COMMERCIAL

## 2023-12-29 DIAGNOSIS — C50.919 INVASIVE LOBULAR CARCINOMA OF BREAST IN FEMALE (MULTI): ICD-10-CM

## 2023-12-29 PROCEDURE — 78306 BONE IMAGING WHOLE BODY: CPT | Performed by: NUCLEAR MEDICINE

## 2023-12-29 PROCEDURE — 3430000001 HC RX 343 DIAGNOSTIC RADIOPHARMACEUTICALS: Performed by: NUCLEAR MEDICINE

## 2023-12-29 PROCEDURE — A9503 TC99M MEDRONATE: HCPCS | Performed by: NUCLEAR MEDICINE

## 2023-12-29 PROCEDURE — 78306 BONE IMAGING WHOLE BODY: CPT

## 2023-12-29 RX ADMIN — TECHNETIUM TC 99M MEDRONATE 26.1 MILLICURIE: 25 INJECTION, POWDER, FOR SOLUTION INTRAVENOUS at 08:15

## 2024-01-03 ENCOUNTER — APPOINTMENT (OUTPATIENT)
Dept: RADIOLOGY | Facility: HOSPITAL | Age: 44
End: 2024-01-03
Payer: COMMERCIAL

## 2024-01-08 RX ORDER — LORAZEPAM 1 MG/1
1 TABLET ORAL EVERY 6 HOURS PRN
COMMUNITY
End: 2024-02-15 | Stop reason: ALTCHOICE

## 2024-01-08 RX ORDER — LORAZEPAM 1 MG/1
1 TABLET ORAL DAILY
COMMUNITY
Start: 2016-08-29 | End: 2024-02-15 | Stop reason: WASHOUT

## 2024-01-08 RX ORDER — TEMAZEPAM 15 MG/1
1 CAPSULE ORAL NIGHTLY PRN
COMMUNITY
Start: 2016-08-29 | End: 2024-02-15 | Stop reason: WASHOUT

## 2024-01-08 RX ORDER — IBUPROFEN 400 MG/1
TABLET ORAL
COMMUNITY
Start: 2023-08-31

## 2024-01-08 RX ORDER — OFLOXACIN 3 MG/ML
SOLUTION/ DROPS OPHTHALMIC
COMMUNITY
Start: 2023-12-19 | End: 2024-02-15 | Stop reason: WASHOUT

## 2024-01-08 RX ORDER — OMEPRAZOLE 20 MG/1
CAPSULE, DELAYED RELEASE ORAL
COMMUNITY
Start: 2023-01-12

## 2024-01-08 RX ORDER — ESOMEPRAZOLE MAGNESIUM 40 MG/1
40 CAPSULE, DELAYED RELEASE ORAL
COMMUNITY
End: 2024-02-15 | Stop reason: ALTCHOICE

## 2024-01-08 RX ORDER — CALCIUM CARBONATE 200(500)MG
TABLET,CHEWABLE ORAL
COMMUNITY
Start: 2023-01-12

## 2024-01-08 RX ORDER — PREDNISOLONE ACETATE 10 MG/ML
SUSPENSION/ DROPS OPHTHALMIC
COMMUNITY
Start: 2023-12-19 | End: 2024-02-15 | Stop reason: WASHOUT

## 2024-01-08 RX ORDER — FLUCONAZOLE 150 MG/1
TABLET ORAL
COMMUNITY
Start: 2023-10-14 | End: 2024-02-15 | Stop reason: ALTCHOICE

## 2024-01-08 RX ORDER — DOXYCYCLINE 100 MG/1
100 CAPSULE ORAL 2 TIMES DAILY
COMMUNITY
Start: 2023-10-05 | End: 2024-02-15 | Stop reason: ALTCHOICE

## 2024-01-08 RX ORDER — TEMAZEPAM 15 MG/1
7.5 CAPSULE ORAL DAILY PRN
COMMUNITY
End: 2024-02-15 | Stop reason: WASHOUT

## 2024-01-09 ENCOUNTER — OFFICE VISIT (OUTPATIENT)
Dept: SURGERY | Facility: CLINIC | Age: 44
End: 2024-01-09
Payer: COMMERCIAL

## 2024-01-09 VITALS
DIASTOLIC BLOOD PRESSURE: 79 MMHG | BODY MASS INDEX: 22.57 KG/M2 | SYSTOLIC BLOOD PRESSURE: 116 MMHG | WEIGHT: 132.2 LBS | OXYGEN SATURATION: 97 % | HEART RATE: 77 BPM | HEIGHT: 64 IN

## 2024-01-09 DIAGNOSIS — R07.89 LEFT-SIDED CHEST WALL PAIN: Primary | ICD-10-CM

## 2024-01-09 DIAGNOSIS — L72.9 BENIGN CYST OF SKIN: ICD-10-CM

## 2024-01-09 DIAGNOSIS — C50.812 MALIGNANT NEOPLASM OF OVERLAPPING SITES OF LEFT BREAST IN FEMALE, ESTROGEN RECEPTOR POSITIVE (MULTI): ICD-10-CM

## 2024-01-09 DIAGNOSIS — Z17.0 MALIGNANT NEOPLASM OF OVERLAPPING SITES OF LEFT BREAST IN FEMALE, ESTROGEN RECEPTOR POSITIVE (MULTI): ICD-10-CM

## 2024-01-09 PROCEDURE — 1036F TOBACCO NON-USER: CPT | Performed by: SURGERY

## 2024-01-09 PROCEDURE — 99214 OFFICE O/P EST MOD 30 MIN: CPT | Performed by: SURGERY

## 2024-01-09 NOTE — PROGRESS NOTES
GENERAL SURGERY OFFICE NOTE    Patient: Pilar Khan    Age: 43 y.o.   Gender: female    MRN: [id number]    PCP: Jv Garrett MD        SUBJECTIVE     Chief Complaint  Follow-up (Patient is here for a follow up swollen lymph nodes in the axillary. Patient states that the lymph node in the right arm pit as gone down. Patient states that the lymph node on the left is still there. )       ADDY Quezada returns to the office for an 8-month office visit of her left breast cancer.  Her original appointment was for March (2 months from now), but called the office with concerns for enlarged axillary lymph nodes.  She continues to have left chest wall discomfort since the reexcision of her mastectomy flap.  However, she feels that the pain occasionally gets severe enough that she does take a Norco pain medication.  She feels that the area is somewhat swollen, but not progressively worsening.  She did relay this information to Dr. Deleon (oncology) who ordered a bone scan.  Bone scan completed 2 weeks ago did not show any evidence of bony metastases. She did note the night after the bone scan, that she had a lump in the right axillary region.  This was a small pea-sized area, and eventually went away.  She does note that she did get the shot for the nuclear medicine scan in the right antecubital area.  About 4 days later, she noticed a similar nodule in the left axillary region, but denies any trauma to the left arm.  She does note that she had shaved both axillary regions prior to the nuclear medicine scan.  She does feel that the lump in the left axillary region has decreased in size, but is not completely gone.  No drainage or bleeding from either site.    She stated that she did take the tamoxifen for about 5 days, but then had to stop due to significant nausea and diarrhea issues.  She has not followed up with oncology regarding an alternative to this medication.    Risk factors for breast cancer: 42-year-old white  female; menarche at age 11/10; first live birth at age 27; no previous breast biopsy; no first degree relative with breast cancer. Her mom did have ovarian cancer at age 22 and is still alive. This gives her a 5-year Mary score of 0.9% and a lifetime risk of 10.7% which overall puts her in a slightly less than average risk category.     ROS  Review of Systems   Constitutional: no fever, no chills, no recent weight gain and no recent weight loss.   Eyes: no eye symptoms, but no loss of vision, no discharge from the eyes, no itching of the eyes and no eye pain.   ENT: no hearing loss, no neck pain and no hoarseness.   Cardiovascular: no chest pain, no palpitations and no lower extremity edema.   Respiratory: no dyspnea, no dyspnea during exertion and no cough.   Breast: no change in breast skin and no axillary adenopathy.   Gastrointestinal: no abdominal pain, no constipation, no heartburn, no vomiting, no blood in stools, bowel movement frequency normal.   Genitourinary: no dysuria and no hematuria.   Musculoskeletal: no arthralgias, no myalgias and no hernia.   Integumentary: no rashes and no skin lesions.   Neurological: no headache, no dizziness, no numbness, no tingling and no limb weakness.   Psychiatric: no anxiety, no depression and no emotional problems.   Endocrine: no heat or cold intolerance and no increased thirst.   Hematologic/Lymphatic: no tendency for easy bleeding and no tendency for easy bruising.   All other systems have been reviewed and are negative for complaint.     HISTORY   History reviewed. No pertinent past medical history.     Past Surgical History:   Procedure Laterality Date    MASTECTOMY Left 2023    re-excision    MASTECTOMY Bilateral 2023        No Known Allergies     Social History     Tobacco Use   Smoking Status Former    Types: Cigarettes    Quit date: 2023    Years since quittin.0   Smokeless Tobacco Never        Social History     Substance and Sexual Activity  "  Alcohol Use Not Currently        HOME MEDICATIONS  Current Outpatient Medications   Medication Instructions    calcium carbonate (Tums) 200 mg calcium chewable tablet oral    doxycycline (VIBRAMYCIN) 100 mg, oral, 2 times daily    esomeprazole (NEXIUM) 40 mg, oral, Daily RT    fluconazole (Diflucan) 150 mg tablet     HYDROcodone-acetaminophen (Norco) 5-325 mg tablet 1 tablet, oral, 4 times daily    ibuprofen 400 mg tablet oral    LORazepam (Ativan) 1 mg tablet 1 tablet, oral, Daily    LORazepam (ATIVAN) 1 mg, oral, Every 6 hours PRN    multivitamin-children's (Cerovite, Jr) chewable tablet 1 tablet, oral, Daily    ofloxacin (Ocuflox) 0.3 % ophthalmic solution INSTILL 1 DROP INTO LEFT EYE 4 TIMES DAILY    omeprazole (PriLOSEC) 20 mg DR capsule oral    ondansetron ODT (Zofran-ODT) 4 mg disintegrating tablet DISSOLVE 1 TABLET IN MOUTH EVERY 6 HOURS    prednisoLONE acetate (Pred-Forte) 1 % ophthalmic suspension INSTILL 1 DROP INTO LEFT EYE 4 TIMES DAILY    tamoxifen (Nolvadex) 20 mg tablet     temazepam (Restoril) 15 mg capsule 1 capsule, oral, Nightly PRN    temazepam (RESTORIL) 7.5 mg, oral, Daily PRN    varenicline (CHANTIX) 0.5 mg, oral, 2 times daily, WITH A FULL GLASS OF WATER          OBJECTIVE   Last Recorded Vitals.  Blood pressure 116/79, pulse 77, height 1.626 m (5' 4.02\"), weight 60 kg (132 lb 3.2 oz), SpO2 97 %.     PHYSICAL EXAM  Physical Exam   General: Well-developed, well-nourished and in no acute distress.  Head: Normocephalic. Atraumatic. Alopecic  Neck/thyroid: Neck is supple.   Eyes: Pupils equal round and reactive to light. Conjunctiva normal.  ENMT: No masses or deformity of external nose. External ears without masses.  Respiratory/Chest: Normal respiratory effort.  Breast: Status post bilateral mastectomy incisions. No erythema, bleeding/drainage or skin necrosis. No palpable abnormalities along the mastectomy incisions. Mild tenderness along the central and lateral aspect of the left chest " wall below the mastectomy incision without palpable mass, fluid collection or erythema.  Lymphatics: No palpable lymphadenopathy of the cervical, supraclavicular or axillary regions. There is a less than 0.5 x 0.5 cm nodule which is lateral to the left mastectomy wound and just lateral to the edge of the pectoralis muscle. This is not in the axilla but more on the lateral left chest wall. Mild tenderness, but no erythema. This was only palpable when the patient was in the sitting position.  No palpable skin lesion of the right axillary region.  In the high left axillary region, very superficial position, there is a 0.5 x 0.25 cm skin nodule without any erythema or active drainage.  Cardiovascular: Regular rate and rhythm.   Abdomen: Soft, nontender, nondistended.   Musculoskeletal: Joints and limbs are grossly normal. Normal gait. Normal range of motion of major joints.  Neuro: Oriented to person, place and time. No obvious neurological deficit. Motor strength grossly normal.  Psych: Tearful and slightly anxious    RESULTS   Labs  No results found for this or any previous visit (from the past 24 hour(s)).    Radiology [facility] BONE WHOLE BODY;  12/29/2023 11:43 am      INDICATION:  Signs/Symptoms:History of left breast cancer, left ribs pain.      COMPARISON:  CT radiation therapy planning dated 08/17/2023.      ACCESSION NUMBER(S):  [id number]      ORDERING CLINICIAN:  JOHNIE BURT      TECHNIQUE:  DIVISION OF NUCLEAR MEDICINE  BONE SCAN, WHOLE BODY plus REGIONAL VIEWS      The patient received an intravenous dose of  26.0 mCi of Tc-99m MDP.  Anterior and posterior images of the skeleton from skull vertex to  feet were then acquired.  Additional regional skeletal images were  also obtained.      FINDINGS:  There is no sign of focal increased radiotracer uptake to suggest  osseous metastatic disease..      Expected, excreted activity is noted in the kidneys and bladder.      IMPRESSION:  1. No evidence of focal  increased radiotracer uptake to suggest  osseous metastatic disease.          I personally reviewed the images/study and I agree with the findings  as stated by Gumaro Coulter MD. This study was interpreted at  [facility], [place], [place]      MACRO:  None      Signed by: Geovany Taylor 12/29/2023 12:22 PM  Dictation workstation:   [id number]      ASSESSMENT / PLAN   Diagnoses and all orders for this visit:  Left-sided chest wall pain  -     US chest; Future  Malignant neoplasm of overlapping sites of left breast in female, estrogen receptor positive (CMS/HCC)  -     Referral to Hematology and Oncology; Future  Benign cyst of skin      Plan  Feb 2022: LEFT: multifocal cancer: UOQ 1.1cm IDC gr2 80/80/neg; UIQ 1cm ILC gr2 90/>95/neg; LIQ 0.5cm mass seen only on MRI (no biopsy); left ax LN BX negative; status post bilateral mastectomy with left sentinel lymph node biopsy (3 sentinel lymph nodes); pT3 (6.5cm multifocal, multicentric) N1mi (1 micromet of 3 LNs) with positive anterior and posterior margins; s/p re-excision of lateral mastectomy wound without residual cancer; adjuvant Cytoxan and Taxotere x4; dj XRT; given prescription for tamoxifen 10/25/2023, but patient did not start    1. I discussed with the patient that given her multifocal, multicentric left breast cancer which includes both invasive ductal carcinoma and invasive lobular carcinoma and a third mass which has not been biopsied yet, that she would require a left mastectomy with sentinel lymph node biopsy. She has requested a prophylactic mastectomy on the right. She has undergone bilateral mastectomy with right sentinel lymph node biopsy. Her pathology showed a positive anterior and posterior margin, although pathology was not able to pinpoint the area of the anterior margin any more than the lower outer quadrant. Her case was presented at the breast tumor board and it was recommended that a lateral half of the  mastectomy incision be reexcised to try to obtain a clear margin. She underwent a reexcision of the lateral aspect of the left mastectomy wound, but pathology did not show any residual cancer.  2. She has declined any type of reconstructive surgery.  3. BRCA negative.  4. Completed Adjuvant chemo (Cytoxan and Taxotere x4 ) with oncology (Dr. Deleon). She has been given a prescription for tamoxifen, but only took this for 5 days.  She felt she was having significant side effects of nausea and diarrhea.  Unsure if there was also a viral illness going on around the house at the time she started this medication.  Encouraged the patient to try the tamoxifen again.  Will also make her a virtual follow-up appointment with Dr. Marks (oncology) to discuss whether there is an alternative to the tamoxifen medication.  5. All pain medication through her primary care physician/pain management.  6.  Has completed adjuvant radiation treatment.  Follow-up with radiation oncology as scheduled.  7. The small nodule just lateral to the pectoralis muscle certainly can represent an area of scar tissue, skin necrosis or even an enlarged lymph node. However, given her previous pathology, this is likely more to be scar tissue.  Ultrasound of this area did not identify anything concerning.  Just continue to monitor.  8. She is encouraged to continue range of motion exercises through and after XRT treatment.  9. Even after her mastectomy surgeries, she had significant left-sided chest wall pain with a benign evaluation.  Oncology recently scheduled a nuclear medicine bone scan which was negative.  Reviewed that this pain has been present since the reexcision mastectomy surgery.  Most likely this represents scar tissue pain.  After review with the patient and discussing options, have elected to proceed with an ultrasound of this region to rule out underlying mass or fluid collection.  Can call the patient with these ultrasound results.  10.   The axillary issues are most likely small little skin cyst or ingrown hairs from recent shaving.  Do not feel that they are deep enough to represent lymph nodes.  Will just monitor at this time.  The right side has completely resolved, and the left side seems to be regressing on its own.  Will reevaluate at her next appointment which is currently scheduled in 2 months.  11.  Keep follow-up appointment with me on 3/15/2024.      Hazel Garcia MD, FACS  [facility] General Surgery  [address] [place] [address];   [facility] [address]  [place]  [address] [phone]

## 2024-01-09 NOTE — PATIENT INSTRUCTIONS
1. Monthly, check your chest wall for any new lumps or abnormalities. If you identify anything abnormal, please contact Dr. Garcia's office immediately. [phone]  2.  Chest where the reexcision was performed, an ultrasound of this area will be scheduled.  Dr. Garcia will call you with these results.  3.  Please, try taking the tamoxifen again.  Please eat before you take the tamoxifen.  If you do develop side effects, you may stop the tamoxifen.  4.  To see if there is an alternative medication to the tamoxifen treatment, a virtual appointment with Dr. Marks (oncology) will be scheduled.  5.  Keep your appointment with Dr. Garcia on 3/15/2024 for routine follow-up of your breast cancer.

## 2024-01-25 ENCOUNTER — HOSPITAL ENCOUNTER (OUTPATIENT)
Dept: RADIOLOGY | Facility: HOSPITAL | Age: 44
Discharge: HOME | End: 2024-01-25
Payer: COMMERCIAL

## 2024-01-25 DIAGNOSIS — R07.89 LEFT-SIDED CHEST WALL PAIN: ICD-10-CM

## 2024-01-25 PROCEDURE — 76604 US EXAM CHEST: CPT | Performed by: RADIOLOGY

## 2024-01-25 PROCEDURE — 76982 USE 1ST TARGET LESION: CPT | Mod: 59

## 2024-01-25 PROCEDURE — 76604 US EXAM CHEST: CPT

## 2024-01-26 NOTE — RESULT ENCOUNTER NOTE
"I called the patient's number and voice message.  The ultrasound does show a small amount of fluid in the area of the \"swelling\" which is consistent with some fluid from the area that was reexcised.  No need for aspiration.  Will just monitor.  The small \"lesion\" measuring 6 mm is unchanged and is not concerning for recurrent cancer.  She will keep her follow-up appointment with me in March."

## 2024-02-15 ENCOUNTER — TELEMEDICINE (OUTPATIENT)
Dept: HEMATOLOGY/ONCOLOGY | Facility: HOSPITAL | Age: 44
End: 2024-02-15
Payer: COMMERCIAL

## 2024-02-15 DIAGNOSIS — C50.812 MALIGNANT NEOPLASM OF OVERLAPPING SITES OF LEFT BREAST IN FEMALE, ESTROGEN RECEPTOR POSITIVE (MULTI): Primary | ICD-10-CM

## 2024-02-15 DIAGNOSIS — Z17.0 ER+ (ESTROGEN RECEPTOR POSITIVE STATUS): ICD-10-CM

## 2024-02-15 DIAGNOSIS — N95.1 PERIMENOPAUSAL: ICD-10-CM

## 2024-02-15 DIAGNOSIS — Z79.811 USE OF ANASTROZOLE: ICD-10-CM

## 2024-02-15 DIAGNOSIS — T38.6X5S: ICD-10-CM

## 2024-02-15 DIAGNOSIS — Z17.0 MALIGNANT NEOPLASM OF OVERLAPPING SITES OF LEFT BREAST IN FEMALE, ESTROGEN RECEPTOR POSITIVE (MULTI): Primary | ICD-10-CM

## 2024-02-15 PROCEDURE — 99203 OFFICE O/P NEW LOW 30 MIN: CPT | Performed by: INTERNAL MEDICINE

## 2024-02-15 PROCEDURE — 1036F TOBACCO NON-USER: CPT | Performed by: INTERNAL MEDICINE

## 2024-02-16 ENCOUNTER — LAB (OUTPATIENT)
Dept: LAB | Facility: LAB | Age: 44
End: 2024-02-16
Payer: COMMERCIAL

## 2024-02-16 DIAGNOSIS — C50.812 MALIGNANT NEOPLASM OF OVERLAPPING SITES OF LEFT BREAST IN FEMALE, ESTROGEN RECEPTOR POSITIVE (MULTI): ICD-10-CM

## 2024-02-16 DIAGNOSIS — Z17.0 MALIGNANT NEOPLASM OF OVERLAPPING SITES OF LEFT BREAST IN FEMALE, ESTROGEN RECEPTOR POSITIVE (MULTI): ICD-10-CM

## 2024-02-16 LAB
ALBUMIN SERPL BCP-MCNC: 4.1 G/DL (ref 3.4–5)
ALP SERPL-CCNC: 68 U/L (ref 33–110)
ALT SERPL W P-5'-P-CCNC: 19 U/L (ref 7–45)
ANION GAP SERPL CALC-SCNC: 13 MMOL/L (ref 10–20)
AST SERPL W P-5'-P-CCNC: 15 U/L (ref 9–39)
BASOPHILS # BLD AUTO: 0.04 X10*3/UL (ref 0–0.1)
BASOPHILS NFR BLD AUTO: 0.7 %
BILIRUB SERPL-MCNC: 0.4 MG/DL (ref 0–1.2)
BUN SERPL-MCNC: 11 MG/DL (ref 6–23)
CALCIUM SERPL-MCNC: 9.3 MG/DL (ref 8.6–10.3)
CHLORIDE SERPL-SCNC: 103 MMOL/L (ref 98–107)
CO2 SERPL-SCNC: 29 MMOL/L (ref 21–32)
CREAT SERPL-MCNC: 0.74 MG/DL (ref 0.5–1.05)
EGFRCR SERPLBLD CKD-EPI 2021: >90 ML/MIN/1.73M*2
EOSINOPHIL # BLD AUTO: 0.25 X10*3/UL (ref 0–0.7)
EOSINOPHIL NFR BLD AUTO: 4.2 %
ERYTHROCYTE [DISTWIDTH] IN BLOOD BY AUTOMATED COUNT: 12.2 % (ref 11.5–14.5)
ESTRADIOL SERPL-MCNC: <19 PG/ML
FSH SERPL-ACNC: 153.3 IU/L
GLUCOSE SERPL-MCNC: 104 MG/DL (ref 74–99)
HCT VFR BLD AUTO: 46.4 % (ref 36–46)
HGB BLD-MCNC: 14.8 G/DL (ref 12–16)
IMM GRANULOCYTES # BLD AUTO: 0.03 X10*3/UL (ref 0–0.7)
IMM GRANULOCYTES NFR BLD AUTO: 0.5 % (ref 0–0.9)
LH SERPL-ACNC: 62.1 IU/L
LYMPHOCYTES # BLD AUTO: 0.9 X10*3/UL (ref 1.2–4.8)
LYMPHOCYTES NFR BLD AUTO: 15 %
MCH RBC QN AUTO: 29.4 PG (ref 26–34)
MCHC RBC AUTO-ENTMCNC: 31.9 G/DL (ref 32–36)
MCV RBC AUTO: 92 FL (ref 80–100)
MONOCYTES # BLD AUTO: 0.52 X10*3/UL (ref 0.1–1)
MONOCYTES NFR BLD AUTO: 8.6 %
NEUTROPHILS # BLD AUTO: 4.28 X10*3/UL (ref 1.2–7.7)
NEUTROPHILS NFR BLD AUTO: 71 %
NRBC BLD-RTO: 0 /100 WBCS (ref 0–0)
PLATELET # BLD AUTO: 261 X10*3/UL (ref 150–450)
POTASSIUM SERPL-SCNC: 4.5 MMOL/L (ref 3.5–5.3)
PROT SERPL-MCNC: 6.5 G/DL (ref 6.4–8.2)
RBC # BLD AUTO: 5.04 X10*6/UL (ref 4–5.2)
SODIUM SERPL-SCNC: 140 MMOL/L (ref 136–145)
WBC # BLD AUTO: 6 X10*3/UL (ref 4.4–11.3)

## 2024-02-16 PROCEDURE — 36415 COLL VENOUS BLD VENIPUNCTURE: CPT

## 2024-02-16 PROCEDURE — 85025 COMPLETE CBC W/AUTO DIFF WBC: CPT

## 2024-02-16 PROCEDURE — 83002 ASSAY OF GONADOTROPIN (LH): CPT

## 2024-02-16 PROCEDURE — 82670 ASSAY OF TOTAL ESTRADIOL: CPT

## 2024-02-16 PROCEDURE — 83001 ASSAY OF GONADOTROPIN (FSH): CPT

## 2024-02-16 PROCEDURE — 80053 COMPREHEN METABOLIC PANEL: CPT

## 2024-02-16 RX ORDER — ANASTROZOLE 1 MG/1
1 TABLET ORAL DAILY
Qty: 30 TABLET | Refills: 2 | Status: SHIPPED | OUTPATIENT
Start: 2024-02-16 | End: 2024-05-16

## 2024-02-18 NOTE — PROGRESS NOTES
DIVISION OF MEDICAL ONCOLOGY    Patient ID: Pilar Khan is a 43 y.o. female.  MRN: [id number]  : 1980  The patient presents to clinic today for her history of left-sided breast cancer.     Cancer Staging   Malignant neoplasm of overlapping sites of left breast in female, estrogen receptor positive (CMS/HCC)  Staging form: Breast, AJCC 8th Edition  - Clinical stage from 2022: Stage IIA (cT3, cN1mi(sn), cM0, G2, ER+, HI+, HER2-) - Signed by Hazel Garcia MD on 2024  - Pathologic stage from 3/9/2023: Stage IB (pT3, pN1mi, cM0, G2, ER+, HI+, HER2-, Oncotype DX score: 13) - Signed by Raghav Marks MD on 2024    Diagnostic/Therapeutic History:  - 10/2022: Palpable left breast mass.  - 3/9/2023: Left mastectomy/SLNBX and right prophylactic mastectomy performed. In the left breast, multiple foci of grade 2 ILC were noted, largest 6.5 cm. No LVI. Posterior and anterior margins focally involved. / SLN's involved with micrometastatic carcinoma, no EDWINA. Right breast was benign.  - 23: Re-excision surgery did not show additional carcinoma.  - Adjuvant TC x4 cycles completed 2023.  - S/p RT to chest wall.  - Tamoxifen attempted (x2), but not tolerated (nausea, diarrhea).    History of Present Illness (HPI)/Interval History:  Pilar Khan presents today for a second opinion regarding her adjuvant endocrine therapy.  Her diagnostic/therapeutic history has been summarized above.  She has been intolerant of tamoxifen.  She has hot flashes and night sweats at baseline.  She has been having some left chest wall pain recently. Bone scan and ultrasound were performed. US demonstrated a small amount of soft tissue fluid underlying mastectomy flap.  She has follow-up with her surgeon in March.  No new bone pain, nausea/vomiting, unintentional weight loss, dyspnea, cough.    Review of Systems:  14-point ROS otherwise negative, as per HPI/Interval History.    Past Medical History:   Diagnosis Date     Breast cancer (CMS/[facility])      Patient Active Problem List   Diagnosis    Malignant neoplasm of overlapping sites of left breast in female, estrogen receptor positive (CMS/[facility])    Left-sided chest wall pain        Past Surgical History:   Procedure Laterality Date    MASTECTOMY Left 2023    re-excision    MASTECTOMY Bilateral 2023       Social History     Socioeconomic History    Marital status:      Spouse name: Not on file    Number of children: Not on file    Years of education: Not on file    Highest education level: Not on file   Occupational History    Not on file   Tobacco Use    Smoking status: Former     Types: Cigarettes     Quit date: 2023     Years since quittin.1    Smokeless tobacco: Never   Substance and Sexual Activity    Alcohol use: Not Currently    Drug use: Not on file    Sexual activity: Not on file   Other Topics Concern    Not on file   Social History Narrative    Not on file     Social Determinants of Health     Financial Resource Strain: Not on file   Food Insecurity: Not on file   Transportation Needs: Not on file   Physical Activity: Not on file   Stress: Not on file   Social Connections: Not on file   Intimate Partner Violence: Not on file   Housing Stability: Not on file       No family history on file.    Allergies:   No Known Allergies      Current Outpatient Medications:     anastrozole (Arimidex) 1 mg tablet, Take 1 tablet (1 mg total) by mouth once daily.  Swallow whole with a drink of water., Disp: 30 tablet, Rfl: 2    calcium carbonate (Tums) 200 mg calcium chewable tablet, Chew., Disp: , Rfl:     HYDROcodone-acetaminophen (Norco) 5-325 mg tablet, Take 1 tablet by mouth 4 times a day., Disp: , Rfl:     ibuprofen 400 mg tablet, Take by mouth., Disp: , Rfl:     multivitamin-children's (Cerovite, Jr) chewable tablet, Chew 1 tablet once daily., Disp: , Rfl:     omeprazole (PriLOSEC) 20 mg DR capsule, Take by mouth., Disp: , Rfl:     varenicline (Chantix) 0.5 mg  tablet, Take 1 tablet (0.5 mg) by mouth 2 times a day. WITH A FULL GLASS OF WATER, Disp: , Rfl:      Objective    BSA: There is no height or weight on file to calculate BSA.  There were no vitals taken for this visit.    ECOG Performance Status:  The ECOG performance scale today is ECO- Fully active, able to carry on all pre-disease performance w/o restriction.    Physical Exam  Constitutional:       General: She is not in acute distress.     Appearance: Normal appearance. She is not ill-appearing.   Eyes:      General: No scleral icterus.  Pulmonary:      Effort: Pulmonary effort is normal. No respiratory distress.   Skin:     Coloration: Skin is not jaundiced.   Neurological:      General: No focal deficit present.      Mental Status: She is alert and oriented to person, place, and time.   Psychiatric:         Mood and Affect: Mood normal.         Behavior: Behavior normal.         Thought Content: Thought content normal.         Judgment: Judgment normal.         Laboratory Data:  Lab Results   Component Value Date    WBC 6.0 2024    HGB 14.8 2024    HCT 46.4 (H) 2024    MCV 92 2024     2024       Chemistry    Lab Results   Component Value Date/Time     2024 1001    K 4.5 2024 1001     2024 1001    CO2 29 2024 1001    BUN 11 2024 1001    CREATININE 0.74 2024 1001    Lab Results   Component Value Date/Time    CALCIUM 9.3 2024 1001    ALKPHOS 68 2024 1001    AST 15 2024 1001    ALT 19 2024 1001    BILITOT 0.4 2024 1001        Radiology:  STUDY:  US CHEST; ;  2024 8:38 am      INDICATION:  Signs/Symptoms:left chest wall pain (inferior mastectomy flap) with  soft tissue swelling.      COMPARISON:  Chest ultrasound performed 2023.      ACCESSION NUMBER(S):  [id number]      ORDERING CLINICIAN:  DANIAL MADDOX      TECHNIQUE:  Targeted grayscale and color Doppler assessment of the left  chest  within the region of concern at the inferior margin of the mastectomy  flap was performed. Static images and cine clips were provided.      FINDINGS:  Deep to the scar there is soft tissue fluid collection within the  subcutaneous fascia measuring an estimated 3.8 cm in maximum length  and 4 mm in maximum thickness as assessed on transverse cine clip. A  capsular component is suggested. There is no associated internal  color Doppler flow signal or significant hyperemia of the surrounding  soft tissues.      Additionally, within the region of scar there is a palpable 6 mm x 4  mm x 5 mm lobular hypoechoic lesion without significant internal  color Doppler flow signal, appearing similar in size and appearance  to comparison examination.      IMPRESSION:  Interval development of small soft tissue fluid collection deep to  the soft tissue scar of the left chest underlying the inferior  mastectomy flap, possibly representing a seroma.      Palpable abnormality corresponds to a 6 mm hypoechoic lobular focus  within the region of scarring that is nonspecific in appearance but  appears similar in size to comparison examination.    Pathology:  Pathology reports personally reviewed.      Assessment/Plan:  Pilar Khan is a 43 y.o. female with a history of multifocal left-sided ER+/Her2- breast cancer, who presents today for initial evaluation and discussion of her adjuvant systemic therapy options.    Left breast cancer.  S/p bilateral mastectomies, adjuvant TC, adjuvant RT. Genetic testing negative.  Tamoxifen has intolerable side effects.  Today, we reviewed the role of adjuvant endocrine therapy in her case.  - Recommended estradiol and FSH/LH levels, all of which demonstrate postmenopausal state.  - Trial of anastrozole 1 mg daily. Potential toxicities discussed. Instructed to call with side effects.  - Baseline DEXA ordered.  - Repeat hormone levels at next visit to ensure no ovarian recovery.    Disposition.  -  RTC 3 months for FUV.  - She was given our contact information and instructed to call with concerns/questions in the interim.    Orders Placed This Encounter   Procedures    CBC and Auto Differential    Estradiol    Follicle Stimulating Hormone    Luteinizing Hormone    Comprehensive Metabolic Panel      Raghav Marks MD  Hematology and Medical Oncology  [facility]

## 2024-02-27 DIAGNOSIS — R11.0 NAUSEA: Primary | ICD-10-CM

## 2024-02-27 RX ORDER — ONDANSETRON HYDROCHLORIDE 8 MG/1
8 TABLET, FILM COATED ORAL 2 TIMES DAILY PRN
Qty: 30 TABLET | Refills: 1 | Status: SHIPPED | OUTPATIENT
Start: 2024-02-27 | End: 2024-03-19

## 2024-03-12 ENCOUNTER — APPOINTMENT (OUTPATIENT)
Dept: HEMATOLOGY/ONCOLOGY | Facility: CLINIC | Age: 44
End: 2024-03-12
Payer: COMMERCIAL

## 2024-03-15 ENCOUNTER — OFFICE VISIT (OUTPATIENT)
Dept: SURGERY | Facility: CLINIC | Age: 44
End: 2024-03-15
Payer: COMMERCIAL

## 2024-03-15 VITALS
WEIGHT: 136 LBS | DIASTOLIC BLOOD PRESSURE: 78 MMHG | SYSTOLIC BLOOD PRESSURE: 112 MMHG | HEART RATE: 71 BPM | HEIGHT: 64 IN | OXYGEN SATURATION: 97 % | BODY MASS INDEX: 23.22 KG/M2

## 2024-03-15 DIAGNOSIS — C50.812 MALIGNANT NEOPLASM OF OVERLAPPING SITES OF LEFT BREAST IN FEMALE, ESTROGEN RECEPTOR POSITIVE (MULTI): Primary | ICD-10-CM

## 2024-03-15 DIAGNOSIS — R07.89 LEFT-SIDED CHEST WALL PAIN: ICD-10-CM

## 2024-03-15 DIAGNOSIS — Z17.0 MALIGNANT NEOPLASM OF OVERLAPPING SITES OF LEFT BREAST IN FEMALE, ESTROGEN RECEPTOR POSITIVE (MULTI): Primary | ICD-10-CM

## 2024-03-15 PROCEDURE — 99213 OFFICE O/P EST LOW 20 MIN: CPT | Performed by: SURGERY

## 2024-03-15 NOTE — PROGRESS NOTES
"    GENERAL SURGERY OFFICE NOTE    Patient: Pilar Khan    Age: 43 y.o.   Gender: female    MRN: [id number]    PCP: Jv Garrett MD        SUBJECTIVE     Chief Complaint  Follow-up (Patient is here for a left breast follow up. Patient states that she has a small lump that she noticed at the beginning of the week. )       ADDY Quezada returns to the office for a 1 year office visit of her left breast cancer.  She continues to note that she has \"severe pain\" along her left anterior chest wall from her mastectomy surgery.  There are no new palpable masses.  She does have some persistent soft tissue swelling.  Ultrasound of this area previously showed just a small amount of fluid.  She is now complaining of significant pain along her posterior left chest wall (more on the lateral aspect) which occurs mostly with movement, but is fairly constant pain.  She is still able to do normal range of motion with her arm.  No palpable mass in this region.  She had discussed with medical oncology (Dr. Marks) about switching off of the tamoxifen.  She had laboratory test showing she was in menopause, so she was started on anastrozole.  She feels she is having significant nausea while on the anastrozole, to the point where she is taking an occasional Zofran for the nausea.  She even stopped the anastrozole for several days as she was anticipating going on a trip, and she did not want to have nausea while on this trip.  She is not currently taking any calcium or vitamin D supplement.  Currently denies any arthritic like pains.  However, she is having multiple hot flashes per day.    Risk factors for breast cancer: 42-year-old white female; menarche at age 11/10; first live birth at age 27; no previous breast biopsy; no first degree relative with breast cancer. Her mom did have ovarian cancer at age 22 and is still alive. This gives her a 5-year Mary score of 0.9% and a lifetime risk of 10.7% which overall puts her in a slightly " less than average risk category.     ROS  Review of Systems   Constitutional: no fever, no chills, no recent weight gain and no recent weight loss.   Eyes: no eye symptoms, but no loss of vision, no discharge from the eyes, no itching of the eyes and no eye pain.   ENT: no hearing loss, no neck pain and no hoarseness.   Cardiovascular: no chest pain, no palpitations and no lower extremity edema.   Respiratory: no dyspnea, no dyspnea during exertion and no cough.   Breast: no change in breast skin and no axillary adenopathy.   Gastrointestinal: no abdominal pain, no constipation, no heartburn, no vomiting, no blood in stools, bowel movement frequency normal.   Genitourinary: no dysuria and no hematuria.   Musculoskeletal: no arthralgias, no myalgias and no hernia.   Integumentary: no rashes and no skin lesions.   Neurological: no headache, no dizziness, no numbness, no tingling and no limb weakness.   Psychiatric: no anxiety, no depression and no emotional problems.   Endocrine: no heat or cold intolerance and no increased thirst.   Hematologic/Lymphatic: no tendency for easy bleeding and no tendency for easy bruising.   All other systems have been reviewed and are negative for complaint.     HISTORY     Past Medical History:   Diagnosis Date    Breast cancer (CMS/[facility])         Past Surgical History:   Procedure Laterality Date    MASTECTOMY Left 2023    re-excision    MASTECTOMY Bilateral 2023        Allergies   Allergen Reactions    Sulfa (Sulfonamide Antibiotics) Other, Unknown and Nausea/vomiting     Caused blood disorder    Promethazine Other and Unknown     Leg cramps        Social History     Tobacco Use   Smoking Status Some Days    Types: Cigarettes    Last attempt to quit: 2023    Years since quittin.2   Smokeless Tobacco Never        Social History     Substance and Sexual Activity   Alcohol Use Not Currently        HOME MEDICATIONS  Current Outpatient Medications   Medication  "Instructions    anastrozole (ARIMIDEX) 1 mg, oral, Daily, Swallow whole with a drink of water.    calcium carbonate (Tums) 200 mg calcium chewable tablet oral    HYDROcodone-acetaminophen (Norco) 5-325 mg tablet 1 tablet, oral, 4 times daily    ibuprofen 400 mg tablet oral    multivitamin-children's (Cerovite, Jr) chewable tablet 1 tablet, oral, Daily    omeprazole (PriLOSEC) 20 mg DR capsule oral    ondansetron (ZOFRAN) 8 mg, oral, 2 times daily PRN    varenicline (CHANTIX) 0.5 mg, oral, 2 times daily, WITH A FULL GLASS OF WATER          OBJECTIVE   Last Recorded Vitals.  Blood pressure 112/78, pulse 71, height 1.626 m (5' 4.02\"), weight 61.7 kg (136 lb), SpO2 97 %.     PHYSICAL EXAM  Physical Exam   General: Well-developed, well-nourished and in no acute distress.  Head: Normocephalic. Atraumatic. Alopecic  Neck/thyroid: Neck is supple.   Eyes: Pupils equal round and reactive to light. Conjunctiva normal.  ENMT: No masses or deformity of external nose. External ears without masses.  Respiratory/Chest: Normal respiratory effort. No palpable abnormality of her left back  Breast: Status post bilateral mastectomy incisions. No erythema, bleeding/drainage or skin necrosis. No palpable abnormalities along the mastectomy incisions. Mild tenderness along the central and lateral aspect of the left chest wall below the mastectomy incision without palpable mass, fluid collection or erythema.  Lymphatics: No palpable lymphadenopathy of the cervical, supraclavicular or axillary regions. There is a less than 0.5 x 0.5 cm nodule which is lateral to the left mastectomy wound and just lateral to the edge of the pectoralis muscle. This is not in the axilla but more on the lateral left chest wall. Mild tenderness, but no erythema. This was only palpable when the patient was in the sitting position.  No palpable skin lesion of the right axillary region.  In the high left axillary region, very superficial position, there is a 0.5 x " 0.25 cm skin nodule without any erythema or active drainage.  Cardiovascular: Regular rate and rhythm.   Abdomen: Soft, nontender, nondistended.   Musculoskeletal: Joints and limbs are grossly normal. Normal gait. Normal range of motion of major joints.  Neuro: Oriented to person, place and time. No obvious neurological deficit. Motor strength grossly normal.  Psych: Tearful and slightly anxious    RESULTS   Labs  No results found for this or any previous visit (from the past 24 hour(s)).    Radiology Resutls  US CHEST; ;  1/25/2024 8:38 am      INDICATION:  Signs/Symptoms:left chest wall pain (inferior mastectomy flap) with  soft tissue swelling.      COMPARISON:  Chest ultrasound performed 09/11/2023.      ACCESSION NUMBER(S):  [id number]      ORDERING CLINICIAN:  DANIAL MADDOX      TECHNIQUE:  Targeted grayscale and color Doppler assessment of the left chest  within the region of concern at the inferior margin of the mastectomy  flap was performed. Static images and cine clips were provided.      FINDINGS:  Deep to the scar there is soft tissue fluid collection within the  subcutaneous fascia measuring an estimated 3.8 cm in maximum length  and 4 mm in maximum thickness as assessed on transverse cine clip. A  capsular component is suggested. There is no associated internal  color Doppler flow signal or significant hyperemia of the surrounding  soft tissues.      Additionally, within the region of scar there is a palpable 6 mm x 4  mm x 5 mm lobular hypoechoic lesion without significant internal  color Doppler flow signal, appearing similar in size and appearance  to comparison examination.      IMPRESSION:  Interval development of small soft tissue fluid collection deep to  the soft tissue scar of the left chest underlying the inferior  mastectomy flap, possibly representing a seroma.      Palpable abnormality corresponds to a 6 mm hypoechoic lobular focus  within the region of scarring that is nonspecific  in appearance but  appears similar in size to comparison examination.      MACRO:  None      Signed by: Junior Ibarra 1/25/2024 4:21 PM  Dictation workstation:   [id number]      ASSESSMENT / PLAN   There are no diagnoses linked to this encounter.      Plan  Feb 2022: LEFT: multifocal cancer: UOQ 1.1cm IDC gr2 80/80/neg; UIQ 1cm ILC gr2 90/>95/neg; LIQ 0.5cm mass seen only on MRI (no biopsy); left ax LN BX negative; status post bilateral mastectomy with left sentinel lymph node biopsy (3 sentinel lymph nodes); pT3 (6.5cm multifocal, multicentric) N1mi (1 micromet of 3 LNs) with positive anterior and posterior margins; s/p re-excision of lateral mastectomy wound without residual cancer; adjuvant Cytoxan and Taxotere x4; dj XRT;  intolerant to Tamoxifen; started on anastrazole    1. I discussed with the patient that given her multifocal, multicentric left breast cancer which includes both invasive ductal carcinoma and invasive lobular carcinoma and a third mass which has not been biopsied yet, that she would require a left mastectomy with sentinel lymph node biopsy. She has requested a prophylactic mastectomy on the right. She has undergone bilateral mastectomy with right sentinel lymph node biopsy. Her pathology showed a positive anterior and posterior margin, although pathology was not able to pinpoint the area of the anterior margin any more than the lower outer quadrant. Her case was presented at the breast tumor board and it was recommended that a lateral half of the mastectomy incision be reexcised to try to obtain a clear margin. She underwent a reexcision of the lateral aspect of the left mastectomy wound, but pathology did not show any residual cancer.  2. She has declined any type of reconstructive surgery.  3. BRCA negative.  4. Completed Adjuvant chemo (Cytoxan and Taxotere x4 ) with oncology (Dr. Deleon). She has been given a prescription for tamoxifen, but only took this for 5 days.  She felt she was  having significant side effects of nausea and diarrhea.  Unsure if there was also a viral illness going on around the house at the time she started this medication.  She had a follow-up appointment with medical oncology (Dr. Marks) 1 month ago and was switched from tamoxifen to anastrozole.  She is having associated nausea with the anastrozole, to the point where she has actually stopped the anastrozole for several days.  Recommended that she contact Dr. Marks to discuss whether to continue with the anastrozole, or switch medications.  She will message him later this week.  She was encouraged to start taking a calcium/vitamin D supplement while on anastrozole like medications.  5. All pain medication through her primary care physician/pain management.  6.  Has completed adjuvant radiation treatment.  Follow-up with radiation oncology as scheduled.  7. The small nodule just lateral to the pectoralis muscle certainly can represent an area of scar tissue, skin necrosis or even an enlarged lymph node. However, given her previous pathology, this is likely more to be scar tissue.  Ultrasound of this area did not identify anything concerning.  Just continue to monitor.  8. She is encouraged to continue range of motion exercises through and after XRT treatment.  9. Even after her mastectomy surgeries, she had significant left-sided chest wall pain with a benign evaluation.  Oncology scheduled a nuclear medicine bone scan which was negative.  Reviewed that this pain has been present since the reexcision mastectomy surgery.  She did have an ultrasound of this region as well which showed a small amount of fluid under the mastectomy flap.  Would not aggressively try to remove this fluid as it can increase risk of infection.  Now, she is complaining of pain along her posterior left chest wall.  No significant abnormality on physical exam.  She is very anxious about the new pain, therefore, we will get CT evaluation of this  area.  Can call the patient with the CT results.  10.  The axillary issues are most likely small little skin cyst or ingrown hairs from recent shaving.  Do not feel that they are deep enough to represent lymph nodes.  Will just monitor at this time.  The right side has completely resolved, and the left side seems to be regressing on its own.  On most recent exam, it is not significantly changed.  11.  Follow-up in the office in 6 months for routine follow-up of her left breast cancer.      Hazel Garcia MD, FACS  [facility] General Surgery  [address] [place] [address];   [address]  [place]  [address] [phone]

## 2024-03-15 NOTE — PATIENT INSTRUCTIONS
1. Monthly, check your chest wall for any new lumps or abnormalities. If you identify anything abnormal, please contact Dr. Garcia's office immediately. [phone]  2.  To evaluate your left chest wall pain, a CT of your chest will be performed.  Dr. Garcia will call you with these results.  3.  While taking anastrozole, you should be on a calcium/vitamin D supplement daily.  If you feel the side effects are significant, please contact Dr. Marks (medical oncology) to discuss alternative medication.  4.  Follow-up with Dr. Garcia in 6 months for routine follow-up of your left breast cancer.

## 2024-03-29 ENCOUNTER — APPOINTMENT (OUTPATIENT)
Dept: RADIOLOGY | Facility: CLINIC | Age: 44
End: 2024-03-29
Payer: COMMERCIAL

## 2024-04-04 ENCOUNTER — HOSPITAL ENCOUNTER (OUTPATIENT)
Dept: RADIOLOGY | Facility: HOSPITAL | Age: 44
Discharge: HOME | End: 2024-04-04
Payer: COMMERCIAL

## 2024-04-04 DIAGNOSIS — R07.89 LEFT-SIDED CHEST WALL PAIN: ICD-10-CM

## 2024-04-04 PROCEDURE — 71260 CT THORAX DX C+: CPT

## 2024-04-04 PROCEDURE — 71260 CT THORAX DX C+: CPT | Performed by: RADIOLOGY

## 2024-04-04 PROCEDURE — 2550000001 HC RX 255 CONTRASTS: Performed by: SURGERY

## 2024-04-04 RX ADMIN — IOHEXOL 50 ML: 350 INJECTION, SOLUTION INTRAVENOUS at 09:04

## 2024-04-05 NOTE — RESULT ENCOUNTER NOTE
Please let her know that the CT of her chest is negative.  There are no masses or abnormalities to explain the chest wall pain.  Also, the fluid of her left anterior chest wall that was previously seen by ultrasound has resolved.  Even her lymph nodes are completely normal.  No concerning findings for cancer.  Keep her routine follow-up as scheduled.

## 2024-04-08 ENCOUNTER — TELEPHONE (OUTPATIENT)
Dept: SURGERY | Facility: CLINIC | Age: 44
End: 2024-04-08
Payer: COMMERCIAL

## 2024-04-08 NOTE — TELEPHONE ENCOUNTER
----- Message from Hazel Garcia MD sent at 4/5/2024  2:45 PM EDT -----  Please let her know that the CT of her chest is negative.  There are no masses or abnormalities to explain the chest wall pain.  Also, the fluid of her left anterior chest wall that was previously seen by ultrasound has resolved.  Even her lymph nodes are completely normal.  No concerning findings for cancer.  Keep her routine follow-up as scheduled.

## 2024-04-15 NOTE — TELEPHONE ENCOUNTER
Tried calling the patient, but had to leave a voicemail.  Reviewed the CT findings which were negative.  Encouraged her to use heat and over-the-counter anti-inflammatories (ibuprofen or Aleve) to help with her pain of her left flank/upper back area.  She was also encouraged to keep her follow-up appointment in August.

## 2024-05-16 ENCOUNTER — APPOINTMENT (OUTPATIENT)
Dept: RADIATION ONCOLOGY | Facility: CLINIC | Age: 44
End: 2024-05-16
Payer: COMMERCIAL

## 2024-05-16 ENCOUNTER — APPOINTMENT (OUTPATIENT)
Dept: HEMATOLOGY/ONCOLOGY | Facility: HOSPITAL | Age: 44
End: 2024-05-16
Payer: COMMERCIAL

## 2024-06-13 ENCOUNTER — TELEMEDICINE (OUTPATIENT)
Dept: HEMATOLOGY/ONCOLOGY | Facility: HOSPITAL | Age: 44
End: 2024-06-13
Payer: COMMERCIAL

## 2024-06-13 DIAGNOSIS — C50.812 MALIGNANT NEOPLASM OF OVERLAPPING SITES OF LEFT BREAST IN FEMALE, ESTROGEN RECEPTOR POSITIVE (MULTI): ICD-10-CM

## 2024-06-13 DIAGNOSIS — Z17.0 MALIGNANT NEOPLASM OF OVERLAPPING SITES OF LEFT BREAST IN FEMALE, ESTROGEN RECEPTOR POSITIVE (MULTI): ICD-10-CM

## 2024-06-13 PROCEDURE — 99212 OFFICE O/P EST SF 10 MIN: CPT | Performed by: INTERNAL MEDICINE

## 2024-06-15 NOTE — PROGRESS NOTES
DIVISION OF MEDICAL ONCOLOGY    Patient ID: Pilar Khan is a 44 y.o. female.  MRN: [id number]  : 1980  The patient presents to clinic today for her history of left-sided breast cancer.     Cancer Staging   Malignant neoplasm of overlapping sites of left breast in female, estrogen receptor positive (Multi)  Staging form: Breast, AJCC 8th Edition  - Clinical stage from 2022: Stage IIA (cT3, cN1mi(sn), cM0, G2, ER+, NE+, HER2-) - Signed by Hazel Garcia MD on 2024  - Pathologic stage from 3/9/2023: Stage IB (pT3, pN1mi, cM0, G2, ER+, NE+, HER2-, Oncotype DX score: 13) - Signed by Raghav Marks MD on 2024    Diagnostic/Therapeutic History:  - 10/2022: Palpable left breast mass.  - 3/9/2023: Left mastectomy/SLNBX and right prophylactic mastectomy performed. In the left breast, multiple foci of grade 2 ILC were noted, largest 6.5 cm. No LVI. Posterior and anterior margins focally involved.  SLN's involved with micrometastatic carcinoma, no EDWINA. Right breast was benign.  - 23: Re-excision surgery did not show additional carcinoma.  - Adjuvant TC x4 cycles completed 2023.  - S/p RT to chest wall.  - Tamoxifen attempted (x2), but not tolerated (nausea, diarrhea).  - Labs demonstrated postmenopausal status; anastrozole attempted 2024. Poorly tolerated with significant nausea and hot flashes.    History of Present Illness (HPI)/Interval History:  Pilar Khan presents today for virtual follow-up (audio and video).  She has stopped anastrozole and is feeling better.  She had a CT scan of her chest that was unremarkable.  Still notes ongoing left-sided rib pain.  Seeing GI for dysphagia.  No new focal bone pain, dyspnea, cough, unintentional weight loss.  She is not interested in trying another type of endocrine therapy.    Review of Systems:  14-point ROS otherwise negative, as per HPI/Interval History.    Past Medical History:   Diagnosis Date    Breast cancer (Multi)      Patient  Active Problem List   Diagnosis    Malignant neoplasm of overlapping sites of left breast in female, estrogen receptor positive (Multi)    Left-sided chest wall pain        Past Surgical History:   Procedure Laterality Date    MASTECTOMY Left 2023    re-excision    MASTECTOMY Bilateral 2023       Social History     Socioeconomic History    Marital status:      Spouse name: Not on file    Number of children: Not on file    Years of education: Not on file    Highest education level: Not on file   Occupational History    Not on file   Tobacco Use    Smoking status: Some Days     Current packs/day: 0.00     Types: Cigarettes     Last attempt to quit: 2023     Years since quittin.4    Smokeless tobacco: Never   Substance and Sexual Activity    Alcohol use: Not Currently    Drug use: Never    Sexual activity: Not on file   Other Topics Concern    Not on file   Social History Narrative    Not on file     Social Determinants of Health     Financial Resource Strain: Not on file   Food Insecurity: Not on file   Transportation Needs: Not on file   Physical Activity: Not on file   Stress: Not on file   Social Connections: Not on file   Intimate Partner Violence: Not on file   Housing Stability: Not on file       No family history on file.    Allergies:   Allergies   Allergen Reactions    Sulfa (Sulfonamide Antibiotics) Other, Unknown and Nausea/vomiting     Caused blood disorder    Promethazine Other and Unknown     Leg cramps         Current Outpatient Medications:     calcium carbonate (Tums) 200 mg calcium chewable tablet, Chew., Disp: , Rfl:     HYDROcodone-acetaminophen (Norco) 5-325 mg tablet, Take 1 tablet by mouth 4 times a day., Disp: , Rfl:     ibuprofen 400 mg tablet, Take by mouth., Disp: , Rfl:     multivitamin-children's (Cerovite, Jr) chewable tablet, Chew 1 tablet once daily., Disp: , Rfl:     omeprazole (PriLOSEC) 20 mg DR capsule, Take by mouth., Disp: , Rfl:     varenicline (Chantix)  0.5 mg tablet, Take 1 tablet (0.5 mg) by mouth 2 times a day. WITH A FULL GLASS OF WATER, Disp: , Rfl:      Objective    BSA: There is no height or weight on file to calculate BSA.  There were no vitals taken for this visit.    ECOG Performance Status:  The ECOG performance scale today is ECO- Fully active, able to carry on all pre-disease performance w/o restriction.    Physical Exam  Constitutional:       General: She is not in acute distress.     Appearance: She is not ill-appearing.   Pulmonary:      Effort: Pulmonary effort is normal. No respiratory distress.   Neurological:      Mental Status: She is oriented to person, place, and time. Mental status is at baseline.   Psychiatric:         Mood and Affect: Mood normal.         Behavior: Behavior normal.         Thought Content: Thought content normal.         Judgment: Judgment normal.       Laboratory Data:  Lab Results   Component Value Date    WBC 6.0 2024    HGB 14.8 2024    HCT 46.4 (H) 2024    MCV 92 2024     2024       Chemistry    Lab Results   Component Value Date/Time     2024 1001    K 4.5 2024 1001     2024 1001    CO2 29 2024 1001    BUN 11 2024 1001    CREATININE 0.74 2024 1001    Lab Results   Component Value Date/Time    CALCIUM 9.3 2024 1001    ALKPHOS 68 2024 1001    AST 15 2024 1001    ALT 19 2024 1001    BILITOT 0.4 2024 1001        Radiology:  === 24 ===    CT CHEST W IV CONTRAST    - Impression -  1. No focal infiltrate, pulmonary nodule or lymphadenopathy  identified.    MACRO:  None    Signed by: Thiago Rapp 2024 11:46 AM  Dictation workstation:   [id number]        Assessment/Plan:  Pilar Khan is a 44 y.o. female with a history of multifocal left-sided ER+/Her2- breast cancer, who presents today for follow-up evaluation.    Left breast cancer.  S/p bilateral mastectomies, adjuvant TC, adjuvant RT. Genetic  testing negative.  Tamoxifen has intolerable side effects. Anastrozole attempted, but also poorly-tolerated.  - She does not wish to try another endocrine therapy agent. Risk of recurrence discussed.  - Proceed with clinical monitoring.  - No need for baseline DEXA at this time.  - She has follow-up with surgery team 8/2024.  - Referral to PM&R for ongoing left chest wall pain.    Disposition.  - RTC to medical oncology as needed.  - She has our contact information and was instructed to call with concerns/questions in the interim.    Orders Placed This Encounter   Procedures    Referral to Physical Medicine Rehab      Raghav Marks MD  Hematology and Medical Oncology  [facility]

## 2024-08-23 ENCOUNTER — APPOINTMENT (OUTPATIENT)
Dept: SURGERY | Facility: CLINIC | Age: 44
End: 2024-08-23
Payer: COMMERCIAL

## 2024-09-04 ENCOUNTER — APPOINTMENT (OUTPATIENT)
Dept: SURGERY | Facility: CLINIC | Age: 44
End: 2024-09-04
Payer: COMMERCIAL

## 2024-09-04 VITALS
DIASTOLIC BLOOD PRESSURE: 87 MMHG | SYSTOLIC BLOOD PRESSURE: 129 MMHG | BODY MASS INDEX: 23.49 KG/M2 | HEIGHT: 64 IN | OXYGEN SATURATION: 98 % | WEIGHT: 137.6 LBS | HEART RATE: 64 BPM

## 2024-09-04 DIAGNOSIS — R07.89 LEFT-SIDED CHEST WALL PAIN: ICD-10-CM

## 2024-09-04 DIAGNOSIS — Z17.0 MALIGNANT NEOPLASM OF OVERLAPPING SITES OF LEFT BREAST IN FEMALE, ESTROGEN RECEPTOR POSITIVE (MULTI): Primary | ICD-10-CM

## 2024-09-04 DIAGNOSIS — Z13.71 BRCA NEGATIVE: ICD-10-CM

## 2024-09-04 DIAGNOSIS — C50.812 MALIGNANT NEOPLASM OF OVERLAPPING SITES OF LEFT BREAST IN FEMALE, ESTROGEN RECEPTOR POSITIVE (MULTI): Primary | ICD-10-CM

## 2024-09-04 PROCEDURE — 99213 OFFICE O/P EST LOW 20 MIN: CPT | Performed by: SURGERY

## 2024-09-04 PROCEDURE — 3008F BODY MASS INDEX DOCD: CPT | Performed by: SURGERY

## 2024-09-04 RX ORDER — ASCORBIC ACID 500 MG
500 TABLET ORAL DAILY
COMMUNITY

## 2024-09-04 NOTE — PROGRESS NOTES
"    GENERAL SURGERY OFFICE NOTE    Patient: Pilar Khan    Age: 44 y.o.   Gender: female    MRN: [id number]    PCP: Jv Garrett MD        SUBJECTIVE     Chief Complaint  Follow-up (Patient is here for a 6 month follow up for a double mastectomy. Patient reports having a hard bump on the left side since her CT which has grown. )       ADDY Quezada returns to the office for a 1.5 year office visit of her left breast cancer.  She was seen earlier this year with complaints of left chest wall pain and a small nodule just above her lateral left mastectomy incision.  CT evaluation did not identify any abnormalities or areas of concerns.  The previous fluid that was present postoperatively had resolved.  Since that time, she continues to do stretching exercises and applying heat to the axillary region.  She feels that she has \"broken multiple cords\" of scar tissue in the axillary region, which has helped her regain about 90% of her range of motion of her left shoulder.  She was supposed to see occupational therapy to help with the range of motion, but had to cancel her appointment for tomorrow due to work issues.  She has not noticed any new palpable masses.  She had previous intolerance to tamoxifen, and recently stopped taking the anastrozole due to side effects as well.  She did see medical oncology back in February 2024, but would like to follow-up with medical oncology here in [place].    Risk factors for breast cancer: 42-year-old white female; menarche at age 11/10; first live birth at age 27; no previous breast biopsy; no first degree relative with breast cancer. Her mom did have ovarian cancer at age 22 and is still alive. This gives her a 5-year Mary score of 0.9% and a lifetime risk of 10.7% which overall puts her in a slightly less than average risk category.     ROS  Review of Systems   Constitutional: no fever, no chills, no recent weight gain and no recent weight loss.   Eyes: no eye symptoms, but no " loss of vision, no discharge from the eyes, no itching of the eyes and no eye pain.   ENT: no hearing loss, no neck pain and no hoarseness.   Cardiovascular: no chest pain, no palpitations and no lower extremity edema.   Respiratory: no dyspnea, no dyspnea during exertion and no cough.   Breast: no change in breast skin and no axillary adenopathy.   Gastrointestinal: no abdominal pain, no constipation, no heartburn, no vomiting, no blood in stools, bowel movement frequency normal.   Genitourinary: no dysuria and no hematuria.   Musculoskeletal: no arthralgias, no myalgias and no hernia.   Integumentary: no rashes and no skin lesions.   Neurological: no headache, no dizziness, no numbness, no tingling and no limb weakness.   Psychiatric: no anxiety, no depression and no emotional problems.   Endocrine: no heat or cold intolerance and no increased thirst.   Hematologic/Lymphatic: no tendency for easy bleeding and no tendency for easy bruising.   All other systems have been reviewed and are negative for complaint.     HISTORY     Past Medical History:   Diagnosis Date    Breast cancer (Multi)         Past Surgical History:   Procedure Laterality Date    MASTECTOMY Left 2023    re-excision    MASTECTOMY Bilateral 2023        Allergies   Allergen Reactions    Sulfa (Sulfonamide Antibiotics) Other, Unknown and Nausea/vomiting     Caused blood disorder    Promethazine Other and Unknown     Leg cramps        Social History     Tobacco Use   Smoking Status Some Days    Current packs/day: 0.00    Types: Cigarettes    Last attempt to quit: 2023    Years since quittin.6   Smokeless Tobacco Never        Social History     Substance and Sexual Activity   Alcohol Use Not Currently        HOME MEDICATIONS  Current Outpatient Medications   Medication Instructions    ascorbic acid (VITAMIN C) 500 mg, oral, Daily    calcium carbonate (Tums) 200 mg calcium chewable tablet oral    HYDROcodone-acetaminophen (Norco)  "5-325 mg tablet 1 tablet, oral, 4 times daily    ibuprofen 400 mg tablet oral    multivitamin-children's (Cerovite, Jr) chewable tablet 1 tablet, oral, Daily    omeprazole (PriLOSEC) 20 mg DR capsule oral    varenicline (CHANTIX) 0.5 mg, oral, 2 times daily, WITH A FULL GLASS OF WATER          OBJECTIVE   Last Recorded Vitals.  Blood pressure 129/87, pulse 64, height 1.626 m (5' 4\"), weight 62.4 kg (137 lb 9.6 oz), SpO2 98%.     PHYSICAL EXAM  Physical Exam   General: Well-developed, well-nourished and in no acute distress.  Head: Normocephalic. Atraumatic. Alopecic  Neck/thyroid: Neck is supple.   Eyes: Pupils equal round and reactive to light. Conjunctiva normal.  ENMT: No masses or deformity of external nose. External ears without masses.  Respiratory/Chest: Normal respiratory effort. No palpable abnormality of her left back  Breast: Status post bilateral mastectomy incisions. No erythema, bleeding/drainage or skin necrosis. No palpable abnormalities along the mastectomy incisions. Mild tenderness along the central and lateral aspect of the left chest wall below the mastectomy incision without palpable mass, fluid collection or erythema.  Lymphatics: No palpable lymphadenopathy of the cervical, supraclavicular or axillary regions. There is a less than 0.5 x 0.5 cm nodule which is lateral to the left mastectomy wound and just lateral to the edge of the pectoralis muscle. This is not in the axilla but more on the lateral left chest wall. Mild tenderness, but no erythema. This was only palpable when the patient was in the sitting position.  No palpable skin lesion of the right axillary region.  In the high left axillary region, very superficial position, there is a 0.5 x 0.25 cm skin nodule without any erythema or active drainage.  Cardiovascular: Regular rate and rhythm.   Abdomen: Soft, nontender, nondistended.   Musculoskeletal: Joints and limbs are grossly normal. Normal gait. Normal range of motion of major " joints.  Neuro: Oriented to person, place and time. No obvious neurological deficit. Motor strength grossly normal.  Psych: Tearful and slightly anxious    RESULTS   Labs  No results found for this or any previous visit (from the past 24 hour(s)).    Radiology Resutls  CT chest w IV contrast  Status: Final result     PACS Images     Show images for CT chest w IV contrast  Signed by    Signed Time Phone Pager   Thiago Rapp MD 4/05/2024 11:46 [phone]     Exam Information    Status Exam Begun Exam Ended   Final 4/04/2024 08:40 4/04/2024 09:00     Study Result    Narrative & Impression   Interpreted By:  Thiago Rapp,   STUDY:  CT CHEST W IV CONTRAST; 4/4/2024 9:00 am      INDICATION:  Signs/Symptoms:left chest wall pain with history of left breast  cancer.      COMPARISON:  None.      ACCESSION NUMBER(S):  [id number]      ORDERING CLINICIAN:  DANIAL MADDOX      TECHNIQUE:  Contiguous axial CT images were obtained through the chest at 2 mm  slice thickness following administration of intravenous contrast. 50  cc of Omnipaque 350 was administered. The images were then  reconstructed in the coronal and sagittal planes.      FINDINGS:  POTENTIAL LIMITATIONS OF THE STUDY: None      CHEST WALL AND LOWER NECK:  Evaluation of the visualized neck base demonstrates no gross mass or  lymphadenopathy.      MEDIASTINUM AND LEISA:  There is no gross axillary, leisa or mediastinal lymphadenopathy  identified.      HEART:  The heart is within normal limits for size. No pericardial effusion  is identified.      VESSELS:  The thoracic aorta is within normal limits for course and caliber,  without evidence of aneurysm.      LUNG/PLEURA/LARGE AIRWAYS:  There is no focal infiltrate, pleural effusion or pneumothorax  identified. No discrete pulmonary nodules or masses are seen.      UPPER ABDOMEN:  Evaluation of the visualized upper abdomen demonstrates no discrete  mass or lymphadenopathy.      OSSEOUS STRUCTURES:  There  is no evidence of acute fracture identified.      IMPRESSION:  1. No focal infiltrate, pulmonary nodule or lymphadenopathy  identified.      MACRO:  None      Signed by: Thiago Rapp 4/5/2024 11:4         ASSESSMENT / PLAN   Diagnoses and all orders for this visit:  Malignant neoplasm of overlapping sites of left breast in female, estrogen receptor positive (Multi)  -     Referral to Hematology and Oncology; Future  Left-sided chest wall pain        Plan  Feb 2022: LEFT: multifocal cancer: UOQ 1.1cm IDC gr2 80/80/neg; UIQ 1cm ILC gr2 90/>95/neg; LIQ 0.5cm mass seen only on MRI (no biopsy); left ax LN BX negative; status post bilateral mastectomy with left sentinel lymph node biopsy (3 sentinel lymph nodes); pT3 (6.5cm multifocal, multicentric) N1mi (1 micromet of 3 LNs) with positive anterior and posterior margins; s/p re-excision of lateral mastectomy wound without residual cancer; adjuvant Cytoxan and Taxotere x4; dj XRT;  intolerant to Tamoxifen; started on anastrazole, but stopped due to nausea and hot flashes    1. I discussed with the patient that given her multifocal, multicentric left breast cancer which includes both invasive ductal carcinoma and invasive lobular carcinoma and a third mass which has not been biopsied yet, that she would require a left mastectomy with sentinel lymph node biopsy. She has requested a prophylactic mastectomy on the right. She has undergone bilateral mastectomy with right sentinel lymph node biopsy. Her pathology showed a positive anterior and posterior margin, although pathology was not able to pinpoint the area of the anterior margin any more than the lower outer quadrant. Her case was presented at the breast tumor board and it was recommended that a lateral half of the mastectomy incision be reexcised to try to obtain a clear margin. She underwent a reexcision of the lateral aspect of the left mastectomy wound, but pathology did not show any residual cancer.  2. She has  declined any type of reconstructive surgery.  3. BRCA negative.  4. Completed Adjuvant chemo (Cytoxan and Taxotere x4 ) with oncology (Dr. Deleon). She has been given a prescription for tamoxifen, but only took this for 5 days.  She felt she was having significant side effects of nausea and diarrhea.  Unsure if there was also a viral illness going on around the house at the time she started this medication.  She had a follow-up appointment with medical oncology (Dr. Marks) 1 month ago and was switched from tamoxifen to anastrozole.  She is having associated nausea with the anastrozole, to the point where she has actually stopped the anastrozole for several days.  Per oncology, she was having significant nausea and hot flashes, so the anastrozole was stopped.  She did not want to do any further endocrine therapy, and the risk of recurrence was reviewed with the patient without completion of the endocrine treatment plan.  5. All pain medication through her primary care physician/pain management.  6.  Has completed adjuvant radiation treatment.  Follow-up with radiation oncology as scheduled.  7. The small nodule just lateral to the pectoralis muscle certainly can represent an area of scar tissue, skin necrosis or even an enlarged lymph node. However, given her previous pathology, this is likely more to be scar tissue.  Ultrasound of this area did not identify anything concerning.  CT of the chest did not identify any area of concern.  Continue to monitor clinically.  8. She is encouraged to continue range of motion exercises through and after XRT treatment.  9. Even after her mastectomy surgeries, she had significant left-sided chest wall pain with a benign evaluation.  Oncology scheduled a nuclear medicine bone scan which was negative.  Reviewed that this pain has been present since the reexcision mastectomy surgery.  She did have an ultrasound of this region as well which showed a small amount of fluid under the  mastectomy flap.  Would not aggressively try to remove this fluid as it can increase risk of infection.  Now, she is complaining of pain along her posterior left chest wall.  No significant abnormality on physical exam or CT evaluation. She has regained about 90% of her range of motion.  Scheduled to see occupational therapy, but had to postpone this appointment due to work issues. She she is anticipating rescheduling this.  10.  The axillary issues are most likely small little skin cyst or ingrown hairs from recent shaving.  Do not feel that they are deep enough to represent lymph nodes.  Will just monitor at this time.  The right side has completely resolved, and the left side seems to be regressing on its own.  On most recent exam, it is not significantly changed.  11.  Follow-up in the office in 6 months for routine follow-up of her left breast cancer.  12.  Her last appointment with Dr. Marks (medical oncology) was February 2024.  She was encouraged to continue every 6-month follow-up with medical oncology.  However, with Dr. Marks going back to [facility], will refer her to the [facility] in [place] for her every 6 month follow-up.      Hazel Garcia MD, FACS  [facility] Surgery  [address] [place] [address];   [address]  [place]  [address] [phone]

## 2024-09-04 NOTE — PATIENT INSTRUCTIONS
1. Monthly, check your chest wall for any new lumps or abnormalities. If you identify anything abnormal, please contact Dr. Garcia's office immediately. [phone]  2.  Follow-up with Dr. Garcia in 6 months for routine follow-up of your left breast cancer.  3.  Will refer to the medical oncologist in [place] for your ongoing follow-up appointments.  Since her last appointment with medical oncology was in February, will make the follow-up appointment 6 months from this date.

## 2024-09-05 ENCOUNTER — APPOINTMENT (OUTPATIENT)
Dept: PHYSICAL MEDICINE AND REHAB | Facility: CLINIC | Age: 44
End: 2024-09-05
Payer: COMMERCIAL

## 2024-12-11 ENCOUNTER — OFFICE VISIT (OUTPATIENT)
Dept: HEMATOLOGY/ONCOLOGY | Facility: CLINIC | Age: 44
End: 2024-12-11
Payer: COMMERCIAL

## 2024-12-11 VITALS
BODY MASS INDEX: 24.39 KG/M2 | TEMPERATURE: 97.5 F | RESPIRATION RATE: 16 BRPM | HEART RATE: 76 BPM | DIASTOLIC BLOOD PRESSURE: 78 MMHG | OXYGEN SATURATION: 97 % | WEIGHT: 142.09 LBS | SYSTOLIC BLOOD PRESSURE: 139 MMHG

## 2024-12-11 DIAGNOSIS — C50.812 MALIGNANT NEOPLASM OF OVERLAPPING SITES OF LEFT BREAST IN FEMALE, ESTROGEN RECEPTOR POSITIVE: ICD-10-CM

## 2024-12-11 DIAGNOSIS — Z17.0 MALIGNANT NEOPLASM OF OVERLAPPING SITES OF LEFT BREAST IN FEMALE, ESTROGEN RECEPTOR POSITIVE: ICD-10-CM

## 2024-12-11 PROCEDURE — 99214 OFFICE O/P EST MOD 30 MIN: CPT | Performed by: INTERNAL MEDICINE

## 2024-12-11 ASSESSMENT — PAIN SCALES - GENERAL: PAINLEVEL_OUTOF10: 0-NO PAIN

## 2024-12-11 NOTE — PATIENT INSTRUCTIONS
Follow up visit with Dr. Deleon for history of left breast cancer.     Bone scan to be completed.     Follow up with Dr. Deleon in 6 months.

## 2024-12-11 NOTE — PROGRESS NOTES
Patient Visit Information:   Visit Type: Follow Up Visit      Cancer History:          Breast         AJCC Edition: 8th (AJCC), Diagnosis Date: Mar 2023, IB, pT3 pN0(i+) cM0 G2     Treatment Synopsis:    4/13/23, reexcision, pathology pending  ast, multifocal, maximum size 65 mm, ER 95%, MI 95%, HER2 negative, associated with lobular carcinoma in situ, posterior  anterior margin positive, status post reexcision, 1 sentinel lymph node positive for micrometastatic disease      Stage Ib, pT3, pN1(omar)Mo, MammaPrint low risk  Oncotype DX score 13     Adjuvant chemotherapy, Cytoxan and Taxotere x4     5/17/23 , c1  6/7/23  ,c2  6/28/23 , c3  7/19/23 ,c4  Status post adjuvant radiotherapy to the left chest wall    Tamoxifen 20 mg p.o. daily, started on 10/25/23, patient did not start tamoxifen  Patient could not tolerate aromatase inhibitor  Right mastectomy, no malignancy  BRCA 1 and 2 negative            HPI   October 2022, she felt a 1 cm mass in the upper outer quadrant of the left breast. She had a mammogram which was not able to  identify the mass. She had a follow-up ultrasound showing a 1.3 cm mass of the left breast of the upper outer quadrant and a slightly  thickened axillary lymph node measuring 5 mm of cortex. She subsequently underwent an ultrasound-guided biopsy which demonstrated invasive ductal carcinoma, grade 2 which was ER/MI positive.      Given her young age, she subsequently underwent a breast MRI which demonstrated 3 masses in the left breast. A follow-up biopsy of the upper inner quadrant left breast mass   measuring 1 cm showed invasive lobular carcinoma, grade 2 which was ER/MI positive. The third breast mass on the left at the  inferior aspect could not be seen by ultrasound on Second Look evaluation. This mass could only be seen by MRI, and subsequently has not undergone a biopsy of the mass #3. She did undergo genetic testing and reports that she was BRCA negative.  Patient was evaluated by  Dr. Klaus Garcia for second opinion .     3/10/23, left simple mastectomy, left sentinel lymph node dissection and right mastectomy      Pathology of left mastectomy did show invasive lobular carcinoma, maximum size 65 mm, multifocal and as a invasive lobular carcinoma measuring 20 mm, 6 mm, 5 mm, 4 mm, 3 mm.  1 sentinel lymph node did show micrometastatic disease, associated with lobular  carcinoma in situ, anterior and posterior margin positive      23, reexcision, no malignancy               History of Present Illness:      ID Statement:    SHYANN ONOFRE is a 43 year old Female        Chief Complaint: Invasive lobular carcinoma left  breast, multifocal   Interval History:          23    Patient has a history of left breast cancer undergo bilateral mastectomy status post  TC x 4 and status post radiotherapy.  Patient is complaining left chest wall bony pain, patient is not taking any kind of hormone therapy.  Patient could not tolerate tamoxifen and aromatase inhibitor she does not want to try anything else.     Review of Systems:   Review of Systems:    Patient is anxious, no headache, no dizziness, no bony pain, no chest pain and shortness of breath, left chest wall pain, no nausea vomiting, no abdominal pain, status  post partial hysterectomy, patient still has ovaries        Allergies and Intolerances:       Allergies:         Phenergan: Drug, Unknown, Active         sulfa drugs: Drug Category, Unknown, Active     Outpatient Medication Profile:  * Patient Currently Takes Medications as of 2023 09:01 documented in Structured Notes         nystatin 100,000 units/mL oral suspension : 4 milliliter(s) orally 4 times a day  Swish in mouth for several seconds and swallow , Start Date: 2023         ondansetron 8 mg oral tablet, disintegratin tab(s) orally every 6  hours, As Needed  -for nausea and vomiting , Start Date: 2023         Diflucan 100 mg oral tablet: 1 tab(s) orally  once a day x 7 days , Start  Date: 07-Jun-2023         dexAMETHasone 4 mg oral tablet: 2 tab(s) orally 2 times a day the day  prior and the 2 days after each chemotherpay treatment , Start Date: 04-May-2023         Norco 325 mg-5 mg oral tablet: 1 cap(s) oral prn         Tums 500 mg oral tablet, chewable: 1 cap(s) oral prn         Motrin: 1 cap(s) oral prn         Chantix: 1 cap(s) oral once a day         Vitamin C 1000 mg oral tablet: 1 tab(s) orally once a day         multivitamin:         Family History: No Family History items are recorded  in the problem list.      Social History:   Social Substance History:  ·  Smoking Status former smoker (1)   ·  Drug Use unknown            Vitals and Measurements:   Vitals: Temp: 36.8  HR: 79  RR: 16  BP: 122/80  SPO2%:   98   Measurements: HT(cm): 162.7  WT(kg): 57.8  BSA: 1.61   BMI:  21.8   Last 3 Weights & Heights: Date:                           Weight/Scale Type:                    Height:   19-Jul-2023 12:12                57.8  kg                     162.7  cm  28-Jun-2023 12:10                58.7  kg                     162.7  cm  07-Jun-2023 12:13                57.5  kg                     162.7  cm      Physical Exam:      Constitutional: awake/alert/oriented x3, no distress,   Head/Neck: Neck supple, no apparent injury, thyroid  without mass or tenderness, No JVD, trachea midline, no bruits   Respiratory/Thorax: Patent airways, CTAB, normal  breath sounds   Cardiovascular: Regular, rate and rhythm,  normal  S 1and S 2   Gastrointestinal: Nondistended, soft, non-tender,  no rebound tenderness or guarding, no masses palpable, no organomegaly, +BS,   Musculoskeletal: ROM intact, no joint swelling, tenderness in the midthoracic area   Extremities: normal extremities, no cyanosis edema,  contusions or wounds, no clubbing   Neurological: alert and oriented x3, intact senses,  motor, response and reflexes, normal strength   Breast: Bilateral   mastectomy, scar is  healing   Lymphatic: No significant lymphadenopathy   Psychological: Appropriate mood and behavior   Skin: Warm and dry, no lesions, no rashes         Lab Results:                3.8             104             N/A             12              0.68     Assessment and Plan:      Assessment and Plan:   Assessment:    1.  Invasive lobular carcinoma left breast, multifocal, maximum size 65 mm, ER 95%, CO 95%, HER2 negative, associated with lobular carcinoma in situ, posterior anterior  margin positive, status post reexcision, 1 sentinel lymph node positive for micrometastatic disease      Stage Ib, pT3, pN1(omar)Mo, MammaPrint low risk , Oncotype DX score 13     Right mastectomy, no malignancy  BRCA 1 and 2 negative     Treatment, Cytoxan, Taxotere      C1, 5/17/23  C2 , 6/07/23   C3 ,6/28/23 7/19/23 , C4  Status post radiotherapy         October 2022, she felt a 1 cm mass in the upper outer quadrant of the left breast. She had a mammogram which was not able to  identify the mass. She had a follow-up ultrasound showing a 1.3 cm mass of the left breast of the upper outer quadrant and a slightly  thickened axillary lymph node measuring 5 mm of cortex. She subsequently underwent an ultrasound-guided biopsy which demonstrated invasive ductal carcinoma, grade 2 which was ER/CO positive.      Given her young age, she subsequently underwent a breast MRI which demonstrated 3 masses in the left breast. A follow-up biopsy of the upper inner quadrant left breast mass   measuring 1 cm showed invasive lobular carcinoma, grade 2 which was ER/CO positive. The third breast mass on the left at the  inferior aspect could not be seen by ultrasound on Second Look evaluation. This mass could only be seen by MRI, and subsequently has not undergone a biopsy of the mass #3. She did undergo genetic testing and reports that she was BRCA negative.  Patient was evaluated by Dr. Klaus Garcia for second opinion .     3/10/23, left simple  mastectomy, left sentinel lymph node dissection and right mastectomy      Pathology of left mastectomy did show invasive lobular carcinoma, maximum size 65 mm, multifocal and as a invasive lobular carcinoma measuring 20 mm, 6 mm, 5 mm, 4 mm, 3 mm.  1 sentinel lymph node did show micrometastatic disease, associated with lobular  carcinoma in situ, anterior and posterior margin positive      4/13/23, reexcision, pathology pending               12/11/24     Patient has a history of left   breast cancer T3 N1 M0, MammaPrint low risk, Oncotype DX 13 status post 3 doses of adjuvant chemotherapy including Cytoxan and Taxotere which is well-tolerated.   Status post adjuvant radiotherapy.    Could not tolerate tamoxifen, no aromatase inhibitor per patient decision     Niccoli patient doing very well physical examination did show tenderness in the midthoracic area.  I will schedule for bone scan and follow-up 6 months  Time spent 30 minutes

## 2024-12-27 ENCOUNTER — HOSPITAL ENCOUNTER (OUTPATIENT)
Dept: RADIOLOGY | Facility: HOSPITAL | Age: 44
Discharge: HOME | End: 2024-12-27
Payer: COMMERCIAL

## 2024-12-27 DIAGNOSIS — Z17.0 MALIGNANT NEOPLASM OF OVERLAPPING SITES OF LEFT BREAST IN FEMALE, ESTROGEN RECEPTOR POSITIVE: ICD-10-CM

## 2024-12-27 DIAGNOSIS — C50.812 MALIGNANT NEOPLASM OF OVERLAPPING SITES OF LEFT BREAST IN FEMALE, ESTROGEN RECEPTOR POSITIVE: ICD-10-CM

## 2024-12-27 PROCEDURE — 78306 BONE IMAGING WHOLE BODY: CPT

## 2024-12-27 PROCEDURE — 3430000001 HC RX 343 DIAGNOSTIC RADIOPHARMACEUTICALS: Performed by: STUDENT IN AN ORGANIZED HEALTH CARE EDUCATION/TRAINING PROGRAM

## 2024-12-27 PROCEDURE — A9503 TC99M MEDRONATE: HCPCS | Performed by: STUDENT IN AN ORGANIZED HEALTH CARE EDUCATION/TRAINING PROGRAM

## 2025-02-01 DIAGNOSIS — R07.89 LEFT-SIDED CHEST WALL PAIN: Primary | ICD-10-CM

## 2025-02-04 ENCOUNTER — APPOINTMENT (OUTPATIENT)
Dept: RADIOLOGY | Facility: HOSPITAL | Age: 45
End: 2025-02-04
Payer: COMMERCIAL

## 2025-02-04 ENCOUNTER — HOSPITAL ENCOUNTER (OUTPATIENT)
Dept: RADIOLOGY | Facility: HOSPITAL | Age: 45
Discharge: HOME | End: 2025-02-04
Payer: COMMERCIAL

## 2025-02-04 DIAGNOSIS — R07.89 LEFT-SIDED CHEST WALL PAIN: ICD-10-CM

## 2025-02-04 PROCEDURE — 76604 US EXAM CHEST: CPT

## 2025-02-07 ENCOUNTER — OFFICE VISIT (OUTPATIENT)
Dept: SURGERY | Facility: CLINIC | Age: 45
End: 2025-02-07
Payer: COMMERCIAL

## 2025-02-07 VITALS
HEIGHT: 64 IN | DIASTOLIC BLOOD PRESSURE: 82 MMHG | OXYGEN SATURATION: 98 % | BODY MASS INDEX: 23.76 KG/M2 | SYSTOLIC BLOOD PRESSURE: 117 MMHG | WEIGHT: 139.2 LBS | HEART RATE: 99 BPM

## 2025-02-07 DIAGNOSIS — Z13.71 BRCA NEGATIVE: ICD-10-CM

## 2025-02-07 DIAGNOSIS — Z17.0 MALIGNANT NEOPLASM OF OVERLAPPING SITES OF LEFT BREAST IN FEMALE, ESTROGEN RECEPTOR POSITIVE: ICD-10-CM

## 2025-02-07 DIAGNOSIS — C50.812 MALIGNANT NEOPLASM OF OVERLAPPING SITES OF LEFT BREAST IN FEMALE, ESTROGEN RECEPTOR POSITIVE: ICD-10-CM

## 2025-02-07 DIAGNOSIS — R22.32 MASS OF AXILLA, LEFT: Primary | ICD-10-CM

## 2025-02-07 PROCEDURE — G2211 COMPLEX E/M VISIT ADD ON: HCPCS | Performed by: SURGERY

## 2025-02-07 PROCEDURE — 99213 OFFICE O/P EST LOW 20 MIN: CPT | Performed by: SURGERY

## 2025-02-07 PROCEDURE — 3008F BODY MASS INDEX DOCD: CPT | Performed by: SURGERY

## 2025-02-07 RX ORDER — ROPINIROLE 1 MG/1
1 TABLET, FILM COATED ORAL
COMMUNITY
Start: 2024-12-28

## 2025-02-07 RX ORDER — PANTOPRAZOLE SODIUM 40 MG/1
1 TABLET, DELAYED RELEASE ORAL
COMMUNITY
Start: 2024-04-12

## 2025-02-07 NOTE — PROGRESS NOTES
"    GENERAL SURGERY OFFICE NOTE    Patient: Pilar Khan    Age: 44 y.o.   Gender: female    MRN: [id number]    PCP: Jv Garrett MD        SUBJECTIVE     Chief Complaint  Follow-up (Patient is here for a LEFT side chest US follow up done on 2/4/25. Patient denies any new issues or concerns.)       ADDY Quezada returns to the office for an almost 1.5 year office visit of her left breast cancer.  She continues to note that she has \"severe pain\" along her left anterior chest wall from her mastectomy surgery, and occasional pain along her lateral left chest wall.  She has had several \"nodules\" along the left chest wall previously with negative workups.  The palpable lump on the lateral aspect of the mastectomy incision is no longer palpable.  However, about a month ago she started to notice a new nodule at the inferior aspect of the axilla, just lateral to the pectoralis muscle.  This appears deep in the tissue, but is different than her previous masses.  She did have a bone scan done in December without any evidence of metastases, but she feels like this lump is new since that radiographic study.  She was sent for an ultrasound which was equivocal findings.     Original risk factors for breast cancer: 42-year-old white female; menarche at age 11/12; first live birth at age 27; no previous breast biopsy; no first degree relative with breast cancer. Her mom did have ovarian cancer at age 22 and is still alive. This gives her a 5-year Mary score of 0.9% and a lifetime risk of 10.7% which overall puts her in a slightly less than average risk category.          ROS  Review of Systems   Constitutional: no fever, no chills, no recent weight gain and no recent weight loss.   Eyes: no eye symptoms, but no loss of vision, no discharge from the eyes, no itching of the eyes and no eye pain.   ENT: no hearing loss, no neck pain and no hoarseness.   Cardiovascular: no chest pain, no palpitations and no lower extremity edema. "   Respiratory: no dyspnea, no dyspnea during exertion and no cough.   Breast: no change in breast skin and no axillary adenopathy.   Gastrointestinal: no abdominal pain, no constipation, no heartburn, no vomiting, no blood in stools, bowel movement frequency normal.   Genitourinary: no dysuria and no hematuria.   Musculoskeletal: no arthralgias, no myalgias and no hernia.   Integumentary: no rashes and no skin lesions.   Neurological: no headache, no dizziness, no numbness, no tingling and no limb weakness.   Psychiatric: no anxiety, no depression and no emotional problems.   Endocrine: no heat or cold intolerance and no increased thirst.   Hematologic/Lymphatic: no tendency for easy bleeding and no tendency for easy bruising.   All other systems have been reviewed and are negative for complaint.       HISTORY     Past Medical History:   Diagnosis Date    Breast cancer (Multi)         Past Surgical History:   Procedure Laterality Date     SECTION, LOW TRANSVERSE      CHOLECYSTECTOMY      MASTECTOMY Left 2023    re-excision    MASTECTOMY Bilateral 2023    TONSILLECTOMY          Allergies   Allergen Reactions    Sulfa (Sulfonamide Antibiotics) Other, Unknown and Nausea/vomiting     Caused blood disorder    Promethazine Other and Unknown     Leg cramps        Social History     Tobacco Use   Smoking Status Some Days    Current packs/day: 0.00    Types: Cigarettes    Last attempt to quit: 2023    Years since quittin.1   Smokeless Tobacco Never        Social History     Substance and Sexual Activity   Alcohol Use Not Currently        HOME MEDICATIONS  Current Outpatient Medications   Medication Instructions    ascorbic acid (VITAMIN C) 500 mg, Daily    calcium carbonate (Tums) 200 mg calcium chewable tablet Chew.    HYDROcodone-acetaminophen (Norco) 5-325 mg tablet 1 tablet, 4 times daily    ibuprofen 400 mg tablet Take by mouth.    pantoprazole (ProtoNix) 40 mg EC tablet 1 tablet,  "Daily (0630)    rOPINIRole (Requip) 1 mg tablet 1 tablet, Daily (0630)    varenicline tartrate (CHANTIX) 0.5 mg, 2 times daily          OBJECTIVE   Last Recorded Vitals.  Blood pressure 117/82, pulse 99, height 1.626 m (5' 4\"), weight 63.1 kg (139 lb 3.2 oz), SpO2 98%.     PHYSICAL EXAM  Physical Exam   General: Well-developed, well-nourished and in no acute distress.  Head: Normocephalic. Atraumatic. Alopecic  Neck/thyroid: Neck is supple.   Eyes: Pupils equal round and reactive to light. Conjunctiva normal.  ENMT: No masses or deformity of external nose. External ears without masses.  Respiratory/Chest: Normal respiratory effort. No palpable abnormality of her left back  Breast: Status post bilateral mastectomy incisions. No erythema, bleeding/drainage or skin necrosis. No palpable abnormalities along the mastectomy incisions. Mild tenderness along the central and lateral aspect of the left chest wall below the mastectomy incision without palpable mass, fluid collection or erythema.  Lymphatics: No palpable lymphadenopathy of the cervical, supraclavicular or axillary regions. The previous nodule lateral to the mastectomy incision is no longer palpable.  No palpable skin lesion of the right axillary region.  In the high left axillary region, very superficial position, there is a 0.5 x 0.25 cm skin nodule without any erythema or active drainage.  In the lower axillary region just lateral to the pectoralis muscle, and deep in the tissue, there is some nodularity without a predominant mass.  (This is the area of the patient's newest concern.)  Cardiovascular: Regular rate and rhythm.   Abdomen: Soft, nontender, nondistended.   Musculoskeletal: Joints and limbs are grossly normal. Normal gait. Normal range of motion of major joints.  Neuro: Oriented to person, place and time. No obvious neurological deficit. Motor strength grossly normal.  Psych: Slightly anxious    RESULTS   Labs  No results found for this or any " previous visit (from the past 24 hours).    Radiology Resutls  US chest  Status: Final result     PACS Images     Show images for US chest  Signed by    Signed Time Phone Pager   Nahum Rogel MD 2/04/2025 17:37 [phone]     Exam Information    Status Exam Begun Exam Ended   Final 2/04/2025 15:43 2/04/2025 16:28     Study Result    Narrative & Impression   Interpreted By:  Nahum Rogel,   STUDY:  US CHEST; ;  2/4/2025 4:28 pm      INDICATION:  Signs/Symptoms:Left lateral chest wall nodule with history of breast  cancer.      ,R07.89 Other chest pain      COMPARISON:  Prior ultrasound examination 01/25/2024      ACCESSION NUMBER(S):  [id number]      ORDERING CLINICIAN:  DANIAL MADDOX      TECHNIQUE:  Ultrasound of the left axilla the site of the palpable abnormality.      FINDINGS:  Previous identified fluid collection of the chest wall just deep to  the scar has resolved. Previously identified hypoechoic lobulated  mass of the scar has resolved.      At the site of the palpable area left lateral chest within the axilla  there is no discrete finding worrisome for malignancy albeit there is  asymmetric tissue which is nonspecific and may be spurious. This  areas best evaluated with MRI examination of the chest wall.      IMPRESSION:  Equivocal findings involving the chest wall at the site of the  palpable abnormality.      Consider MRI of the chest wall for further evaluation.      MACRO:  None      Signed by: Nahum Rogel 2/4/2025 5:37 PM  Dictation workstation:   [id number]       NM bone whole body  Status: Final result     PACS Images     Show images for NM bone whole body  Signed by    Signed Time Phone Pager   Geovany Taylor MD 12/30/2024 10:20 [phone]     Exam Information    Status Exam Begun Exam Ended   Final 12/27/2024 12:07 12/27/2024 15:38     Study Result    Narrative & Impression   Interpreted By:  Geovany Taylor and Kamau Nyokabi   STUDY:  NM BONE WHOLE BODY;   12/27/2024 3:38 pm      INDICATION:  Signs/Symptoms:History of left breast cancer, midthoracic tenderness.      ,C50.812 Malignant neoplasm of overlapping sites of left female  breast,Z17.0 Estrogen receptor positive status (ER+)      COMPARISON:  CT chest 04/04/2020, nuclear medicine whole-body bone scan 12/29/2023      ACCESSION NUMBER(S):  [id number]      ORDERING CLINICIAN:  JOHNIE BURT      TECHNIQUE:  DIVISION OF NUCLEAR MEDICINE  BONE SCAN, WHOLE BODY plus REGIONAL VIEWS      The patient received an intravenous dose of  25 mCi of Tc-99m MDP.  Anterior and posterior images of the skeleton from skull vertex to  feet were then acquired.  Additional regional skeletal images were  also obtained.      FINDINGS:  There is no abnormal focal radiotracer uptake to suggest osseous  metastatic disease.      Expected, excreted urinary activity is noted in the kidneys and  bladder.      IMPRESSION:  1. No scintigraphic evidence of osseous metastatic disease.          I personally reviewed the images/study and I agree with the findings  as stated.  This study was interpreted at [facility], [place], [place].      MACRO:  None      Signed by: Geovany Taylor 12/30/2024 10:20 AM  Dictation workstation:   [id number]           ASSESSMENT / PLAN   Diagnoses and all orders for this visit:  Mass of axilla, left  -     MR chest w and wo IV contrast; Future  Malignant neoplasm of overlapping sites of left breast in female, estrogen receptor positive  -     MR chest w and wo IV contrast; Future  BRCA negative          Plan  Feb 2022: LEFT: multifocal cancer: UOQ 1.1cm IDC gr2 80/80/neg; UIQ 1cm ILC gr2 90/>95/neg; LIQ 0.5cm mass seen only on MRI (no biopsy); left ax LN BX negative; status post bilateral mastectomy with left sentinel lymph node biopsy (3 sentinel lymph nodes); pT3 (6.5cm multifocal, multicentric) N1mi (1 micromet of 3 LNs) with positive anterior and posterior margins; s/p re-excision  of lateral mastectomy wound without residual cancer; adjuvant Cytoxan and Taxotere x4; dj XRT;  intolerant to Tamoxifen; started on anastrazole, but stopped due to nausea and hot flashes     1. I discussed with the patient that given her multifocal, multicentric left breast cancer which includes both invasive ductal carcinoma and invasive lobular carcinoma and a third mass which has not been biopsied yet, that she would require a left mastectomy with sentinel lymph node biopsy. She has requested a prophylactic mastectomy on the right. She has undergone bilateral mastectomy with right sentinel lymph node biopsy. Her pathology showed a positive anterior and posterior margin, although pathology was not able to pinpoint the area of the anterior margin any more than the lower outer quadrant. Her case was presented at the breast tumor board and it was recommended that a lateral half of the mastectomy incision be reexcised to try to obtain a clear margin. She underwent a reexcision of the lateral aspect of the left mastectomy wound, but pathology did not show any residual cancer.  2. She has declined any type of reconstructive surgery.  3. BRCA negative.  4. Completed Adjuvant chemo (Cytoxan and Taxotere x4 ) with oncology (Dr. Deleon). She has been given a prescription for tamoxifen, but only took this for 5 days.  She felt she was having significant side effects of nausea and diarrhea.  Unsure if there was also a viral illness going on around the house at the time she started this medication.  She had a follow-up appointment with medical oncology (Dr. Marks) 1 month ago and was switched from tamoxifen to anastrozole.  She is having associated nausea with the anastrozole, to the point where she has actually stopped the anastrozole for several days.  Per oncology, she was having significant nausea and hot flashes, so the anastrozole was stopped.  She did not want to do any further endocrine therapy, and the risk of  recurrence was reviewed with the patient without completion of the endocrine treatment plan.  5. All pain medication through her primary care physician/pain management.  6.  Has completed adjuvant radiation treatment.  Follow-up with radiation oncology as scheduled.  7. The small nodule just lateral to the pectoralis muscle just lateral to the mastectomy incision line certainly can represent an area of scar tissue, skin necrosis or even an enlarged lymph node. However, given her previous pathology, this is likely more to be scar tissue.  Ultrasound of this area did not identify anything concerning.  CT of the chest did not identify any area of concern.  Follow-up physical exam did not identify any persistent nodule in this area.  8. She is encouraged to continue range of motion exercises through and after XRT treatment.  9. Even after her mastectomy surgeries, she had significant left-sided chest wall pain with a benign evaluation.  Oncology scheduled a nuclear medicine bone scan which was negative.  Reviewed that this pain has been present since the reexcision mastectomy surgery.  She did have an ultrasound of this region as well which showed a small amount of fluid under the mastectomy flap.  Would not aggressively try to remove this fluid as it can increase risk of infection.  Now, she is complaining of pain along her posterior left chest wall.  No significant abnormality on physical exam or CT evaluation. She has regained about 90% of her range of motion.  Scheduled to see occupational therapy, but had to postpone this appointment due to work issues. She she is anticipating rescheduling this.  10.  The axillary issues are most likely small little skin cyst or ingrown hairs from recent shaving.  Do not feel that they are deep enough to represent lymph nodes.  Will just monitor at this time.  The right side has completely resolved, and the left side seems to be regressing on its own.  On most recent exam, it is  not significantly changed.  11.  Follow-up in the office in 6 months for routine follow-up of her left breast cancer.  12.  She has found another nodule just lateral to the pectoralis muscle in the lower axillary region.  Ultrasound is equivocal.  Physical exam identifies some thickened tissue without a predominant mass.  Gave the option of a 6-month follow-up ultrasound, evaluation with MRI now or surgical excision of this area given her high anxiety about recurrent cancer.  She has elected to proceed with the MRI.  Will call the patient with the results.    Hazel Garcia MD, FACS  [facility]  [address] [place] [address];   [address]  [place]  [address] [phone]

## 2025-02-07 NOTE — PATIENT INSTRUCTIONS
1.  Every month, check your chest wall for any abnormalities.  If you feel any new abnormalities, please call Dr. Garcia's office.  2.  Follow-up with Dr. Garcia in 6 months for routine follow-up of your left breast cancer.  3.  To further evaluate the left chest wall/armpit mass, a chest MRI will be scheduled.  Dr. Garcia will call you with these results.

## 2025-02-10 ENCOUNTER — TELEPHONE (OUTPATIENT)
Dept: SURGERY | Facility: CLINIC | Age: 45
End: 2025-02-10
Payer: COMMERCIAL

## 2025-02-10 NOTE — TELEPHONE ENCOUNTER
Patient was seen on Friday, 02/07/2025. Patient requires a work note for office appointment stating that she was seen. Please advise.

## 2025-02-24 ENCOUNTER — TELEPHONE (OUTPATIENT)
Dept: SURGERY | Facility: CLINIC | Age: 45
End: 2025-02-24
Payer: COMMERCIAL

## 2025-02-24 ENCOUNTER — HOSPITAL ENCOUNTER (OUTPATIENT)
Dept: RADIOLOGY | Facility: HOSPITAL | Age: 45
Discharge: HOME | End: 2025-02-24
Payer: COMMERCIAL

## 2025-02-24 DIAGNOSIS — R22.32 MASS OF AXILLA, LEFT: ICD-10-CM

## 2025-02-24 DIAGNOSIS — Z17.0 MALIGNANT NEOPLASM OF OVERLAPPING SITES OF LEFT BREAST IN FEMALE, ESTROGEN RECEPTOR POSITIVE: ICD-10-CM

## 2025-02-24 DIAGNOSIS — C50.812 MALIGNANT NEOPLASM OF OVERLAPPING SITES OF LEFT BREAST IN FEMALE, ESTROGEN RECEPTOR POSITIVE: ICD-10-CM

## 2025-02-24 PROCEDURE — 71552 MRI CHEST W/O & W/DYE: CPT

## 2025-02-24 PROCEDURE — A9575 INJ GADOTERATE MEGLUMI 0.1ML: HCPCS | Performed by: SURGERY

## 2025-02-24 PROCEDURE — 71552 MRI CHEST W/O & W/DYE: CPT | Performed by: RADIOLOGY

## 2025-02-24 PROCEDURE — 2550000001 HC RX 255 CONTRASTS: Performed by: SURGERY

## 2025-02-24 RX ORDER — GADOTERATE MEGLUMINE 376.9 MG/ML
0.2 INJECTION INTRAVENOUS
Status: COMPLETED | OUTPATIENT
Start: 2025-02-24 | End: 2025-02-24

## 2025-02-24 RX ADMIN — GADOTERATE MEGLUMINE 12.5 ML: 376.9 INJECTION INTRAVENOUS at 07:40

## 2025-02-24 NOTE — TELEPHONE ENCOUNTER
----- Message from Dmitry PRO sent at 2/24/2025 10:20 AM EST -----  Patient is informed.  ----- Message -----  From: Hazel Garcia MD  Sent: 2/24/2025  10:07 AM EST  To: Dmirty Mendez MA    Please, call the patient and let her know that the MRI does not find anything concerning for recurrent cancer.  Will not schedule for a repeat MRI or ultrasound at this time.  Would like to see her in the office in 6 months (already has appointment scheduled for August) and can make determination of any x-rays needed after her physical exam.  Obviously, call the office sooner if she feels any other abnormalities.

## 2025-02-24 NOTE — RESULT ENCOUNTER NOTE
Please, call the patient and let her know that the MRI does not find anything concerning for recurrent cancer.  Will not schedule for a repeat MRI or ultrasound at this time.  Would like to see her in the office in 6 months (already has appointment scheduled for August) and can make determination of any x-rays needed after her physical exam.  Obviously, call the office sooner if she feels any other abnormalities.

## 2025-03-14 ENCOUNTER — APPOINTMENT (OUTPATIENT)
Dept: SURGERY | Facility: CLINIC | Age: 45
End: 2025-03-14
Payer: COMMERCIAL

## 2025-06-17 ENCOUNTER — OFFICE VISIT (OUTPATIENT)
Dept: HEMATOLOGY/ONCOLOGY | Facility: CLINIC | Age: 45
End: 2025-06-17
Payer: COMMERCIAL

## 2025-06-17 VITALS
TEMPERATURE: 98.6 F | HEART RATE: 72 BPM | WEIGHT: 146.83 LBS | BODY MASS INDEX: 24.46 KG/M2 | OXYGEN SATURATION: 98 % | HEIGHT: 65 IN | SYSTOLIC BLOOD PRESSURE: 128 MMHG | RESPIRATION RATE: 16 BRPM | DIASTOLIC BLOOD PRESSURE: 98 MMHG

## 2025-06-17 DIAGNOSIS — Z17.0 MALIGNANT NEOPLASM OF OVERLAPPING SITES OF LEFT BREAST IN FEMALE, ESTROGEN RECEPTOR POSITIVE: ICD-10-CM

## 2025-06-17 DIAGNOSIS — C50.812 MALIGNANT NEOPLASM OF OVERLAPPING SITES OF LEFT BREAST IN FEMALE, ESTROGEN RECEPTOR POSITIVE: ICD-10-CM

## 2025-06-17 LAB
ALBUMIN SERPL BCP-MCNC: 4 G/DL (ref 3.4–5)
ALP SERPL-CCNC: 70 U/L (ref 33–110)
ALT SERPL W P-5'-P-CCNC: 13 U/L (ref 7–45)
ANION GAP SERPL CALC-SCNC: 13 MMOL/L (ref 10–20)
AST SERPL W P-5'-P-CCNC: 14 U/L (ref 9–39)
BASOPHILS # BLD AUTO: 0.04 X10*3/UL (ref 0–0.1)
BASOPHILS NFR BLD AUTO: 0.6 %
BILIRUB SERPL-MCNC: 0.3 MG/DL (ref 0–1.2)
BUN SERPL-MCNC: 16 MG/DL (ref 6–23)
CALCIUM SERPL-MCNC: 9.2 MG/DL (ref 8.6–10.3)
CANCER AG27-29 SERPL-ACNC: 8.3 U/ML (ref 0–38.6)
CHLORIDE SERPL-SCNC: 105 MMOL/L (ref 98–107)
CO2 SERPL-SCNC: 25 MMOL/L (ref 21–32)
CREAT SERPL-MCNC: 0.82 MG/DL (ref 0.5–1.05)
EGFRCR SERPLBLD CKD-EPI 2021: 89 ML/MIN/1.73M*2
EOSINOPHIL # BLD AUTO: 0.56 X10*3/UL (ref 0–0.7)
EOSINOPHIL NFR BLD AUTO: 8.5 %
ERYTHROCYTE [DISTWIDTH] IN BLOOD BY AUTOMATED COUNT: 12.4 % (ref 11.5–14.5)
GLUCOSE SERPL-MCNC: 75 MG/DL (ref 74–99)
HCT VFR BLD AUTO: 42.3 % (ref 36–46)
HGB BLD-MCNC: 13.8 G/DL (ref 12–16)
IMM GRANULOCYTES # BLD AUTO: 0.02 X10*3/UL (ref 0–0.7)
IMM GRANULOCYTES NFR BLD AUTO: 0.3 % (ref 0–0.9)
LYMPHOCYTES # BLD AUTO: 1.11 X10*3/UL (ref 1.2–4.8)
LYMPHOCYTES NFR BLD AUTO: 16.8 %
MCH RBC QN AUTO: 29.6 PG (ref 26–34)
MCHC RBC AUTO-ENTMCNC: 32.6 G/DL (ref 32–36)
MCV RBC AUTO: 91 FL (ref 80–100)
MONOCYTES # BLD AUTO: 0.61 X10*3/UL (ref 0.1–1)
MONOCYTES NFR BLD AUTO: 9.3 %
NEUTROPHILS # BLD AUTO: 4.25 X10*3/UL (ref 1.2–7.7)
NEUTROPHILS NFR BLD AUTO: 64.5 %
PLATELET # BLD AUTO: 251 X10*3/UL (ref 150–450)
POTASSIUM SERPL-SCNC: 3.8 MMOL/L (ref 3.5–5.3)
PROT SERPL-MCNC: 6.7 G/DL (ref 6.4–8.2)
RBC # BLD AUTO: 4.66 X10*6/UL (ref 4–5.2)
SODIUM SERPL-SCNC: 139 MMOL/L (ref 136–145)
WBC # BLD AUTO: 6.6 X10*3/UL (ref 4.4–11.3)

## 2025-06-17 PROCEDURE — 80053 COMPREHEN METABOLIC PANEL: CPT | Performed by: INTERNAL MEDICINE

## 2025-06-17 PROCEDURE — 99214 OFFICE O/P EST MOD 30 MIN: CPT | Performed by: INTERNAL MEDICINE

## 2025-06-17 PROCEDURE — 3008F BODY MASS INDEX DOCD: CPT | Performed by: INTERNAL MEDICINE

## 2025-06-17 PROCEDURE — 86300 IMMUNOASSAY TUMOR CA 15-3: CPT | Mod: PORLAB | Performed by: INTERNAL MEDICINE

## 2025-06-17 PROCEDURE — 36415 COLL VENOUS BLD VENIPUNCTURE: CPT | Performed by: INTERNAL MEDICINE

## 2025-06-17 PROCEDURE — 85025 COMPLETE CBC W/AUTO DIFF WBC: CPT | Performed by: INTERNAL MEDICINE

## 2025-06-17 RX ORDER — MULTIVIT-MIN/IRON FUM/FOLIC AC 7.5 MG-4
1 TABLET ORAL DAILY
COMMUNITY

## 2025-06-17 ASSESSMENT — PAIN SCALES - GENERAL: PAINLEVEL_OUTOF10: 3

## 2025-06-17 NOTE — PROGRESS NOTES
Pilar is here alone for follow up with Dr Deleon. She is doing well. Labs drawn today. Plan to follow up in 6 months with blood work. Patient verbalized understanding and agreement with the plan.  Education Documentation  Treatment Plan and Schedule, taught by Ashlee Cuevas RN at 6/17/2025 10:03 AM.  Learner: Patient  Readiness: Acceptance  Method: Explanation, Handout  Response: Verbalizes Understanding  Comment: AVS    Tumor Markers, taught by Ashlee Cuevas RN at 6/17/2025 10:03 AM.  Learner: Patient  Readiness: Acceptance  Method: Explanation, Handout  Response: Verbalizes Understanding  Comment: AVS    Comprehensive Metabolic Panel (CMP), taught by Ashlee Cuevas RN at 6/17/2025 10:03 AM.  Learner: Patient  Readiness: Acceptance  Method: Explanation, Handout  Response: Verbalizes Understanding  Comment: AVS    Complete Blood Count with Differential (CBC w/ Diff), taught by Ashlee Cuevas RN at 6/17/2025 10:03 AM.  Learner: Patient  Readiness: Acceptance  Method: Explanation, Handout  Response: Verbalizes Understanding  Comment: AVS    When and How to Contact Clinic, taught by Ashlee Cuevas RN at 6/17/2025 10:03 AM.  Learner: Patient  Readiness: Acceptance  Method: Explanation, Handout  Response: Verbalizes Understanding  Comment: AVS    Education Comments  No comments found.

## 2025-06-17 NOTE — PATIENT INSTRUCTIONS
You saw Dr Deleon today and had labs drawn.    Dr Gray would like to see you back in 6 months with labs prior to your appt,    Call the office at [phone] with questions or needs.  You may also contact your nurse or doctor with non-urgent issues by sending a [employer] message.  Reminders  Medicine refills: call or send a [email or web address] message to request medicine refills at least 5 to 7 days before you run out.  Labs: You will need labs drawn 2 to 3 days before your next treatment

## 2025-06-17 NOTE — PROGRESS NOTES
Pilar Khan  Female, 45 y.o., 1980  MRN: [id number]     Patient Visit Information:   Visit Type: Follow Up Visit      Cancer History:          Breast         AJCC Edition: 8th (AJCC), Diagnosis Date: Mar 2023, IB, pT3 pN0(i+) cM0 G2     Treatment Synopsis:    4/13/23, reexcision, left breast mass   pathology    Invasive lobular carcinoma, multifocal, maximum size 65 mm, ER 95%, [place] 95%, HER2 negative, associated with lobular carcinoma in situ, posterior  anterior margin positive, status post reexcision, 1 sentinel lymph node positive for micrometastatic disease      Stage Ib, pT3, pN1(omar)Mo, MammaPrint low risk  Oncotype DX score 13     Adjuvant chemotherapy, Cytoxan and Taxotere x4     5/17/23 , c1  6/7/23  ,c2  6/28/23 , c3  7/19/23 ,c4  Status post adjuvant radiotherapy to the left chest wall    Tamoxifen 20 mg p.o. daily, started on 10/25/23, patient did not start tamoxifen  Patient could not tolerate aromatase inhibitor  Right mastectomy, no malignancy  BRCA 1 and 2 negative   12/24 , bone scan, no metastatic disease   2/24/25 , MRI of the chest, no suspicious mass or enhancement in the left axilla      Interval History:          6/17/25    Patient has a history of left breast cancer undergo bilateral mastectomy status post  TC x 4 and status post radiotherapy.  Patient is complaining left chest wall bony pain, patient is not taking any kind of hormone therapy.  Patient could not tolerate tamoxifen and aromatase inhibitor she does not want to try anything else.  Complaining of bony pain, bone scan negative, left axillary swelling and MRI did not show any mass.  Patient doing relatively very well      HPI   October 2022, she felt a 1 cm mass in the upper outer quadrant of the left breast. She had a mammogram which was not able to  identify the mass. She had a follow-up ultrasound showing a 1.3 cm mass of the left breast of the upper outer quadrant and a slightly  thickened axillary lymph node measuring 5 mm  of cortex. She subsequently underwent an ultrasound-guided biopsy which demonstrated invasive ductal carcinoma, grade 2 which was ER/LA positive.      Given her young age, she subsequently underwent a breast MRI which demonstrated 3 masses in the left breast. A follow-up biopsy of the upper inner quadrant left breast mass   measuring 1 cm showed invasive lobular carcinoma, grade 2 which was ER/LA positive. The third breast mass on the left at the  inferior aspect could not be seen by ultrasound on Second Look evaluation. This mass could only be seen by MRI, and subsequently has not undergone a biopsy of the mass #3. She did undergo genetic testing and reports that she was BRCA negative.  Patient was evaluated by Dr. Klaus Garcia for second opinion .     3/10/23, left simple mastectomy, left sentinel lymph node dissection and right mastectomy      Pathology of left mastectomy did show invasive lobular carcinoma, maximum size 65 mm, multifocal and as a invasive lobular carcinoma measuring 20 mm, 6 mm, 5 mm, 4 mm, 3 mm.  1 sentinel lymph node did show micrometastatic disease, associated with lobular  carcinoma in situ, anterior and posterior margin positive      4/13/23, reexcision, no malignancy              Review of Systems:   Review of Systems:    Patient is anxious, no headache, no dizziness, no bony pain, no chest pain and shortness of breath, left chest wall pain, no nausea vomiting, no abdominal pain, status  post partial hysterectomy, patient still has ovaries        Allergies and Intolerances:       Allergies:         Phenergan: Drug, Unknown, Active         sulfa drugs: Drug Category, Unknown, Active     Outpatient Medication Profile:  * Patient Currently Takes Medications as of 18-Jul-2023 09:01 documented in Structured Notes         nystatin 100,000 units/mL oral suspension : 4 milliliter(s) orally 4 times a day  Swish in mouth for several seconds and swallow , Start Date: 26-Jun-2023          ondansetron 8 mg oral tablet, disintegratin tab(s) orally every 6  hours, As Needed  -for nausea and vomiting , Start Date: 2023         Diflucan 100 mg oral tablet: 1 tab(s) orally once a day x 7 days , Start  Date: 2023         dexAMETHasone 4 mg oral tablet: 2 tab(s) orally 2 times a day the day  prior and the 2 days after each chemotherpay treatment , Start Date: 04-May-2023         Norco 325 mg-5 mg oral tablet: 1 cap(s) oral prn         Tums 500 mg oral tablet, chewable: 1 cap(s) oral prn         Motrin: 1 cap(s) oral prn         Chantix: 1 cap(s) oral once a day         Vitamin C 1000 mg oral tablet: 1 tab(s) orally once a day         multivitamin:         Family History: No Family History items are recorded  in the problem list.      Social History:   Social Substance History:  ·  Smoking Status former smoker (1)   ·  Drug Use unknown            Vitals and Measurements:   Vitals: Temp: 36.8  HR: 79  RR: 16  BP: 122/80  SPO2%:   98   Measurements: HT(cm): 162.7  WT(kg): 57.8  BSA: 1.61   BMI:  21.8   Last 3 Weights & Heights: Date:                           Weight/Scale Type:                    Height:   2023 12:12                57.8  kg                     162.7  cm  2023 12:10                58.7  kg                     162.7  cm  2023 12:13                57.5  kg                     162.7  cm      Physical Exam:      Constitutional: awake/alert/oriented x3, no distress,   Head/Neck: Neck supple, no apparent injury, thyroid  without mass or tenderness, No JVD, trachea midline, no bruits   Respiratory/Thorax: Patent airways, CTAB, normal  breath sounds   Cardiovascular: Regular, rate and rhythm,  normal  S 1and S 2   Gastrointestinal: Nondistended, soft, non-tender,  no rebound tenderness or guarding, no masses palpable, no organomegaly, +BS,   Musculoskeletal: ROM intact, no joint swelling, tenderness in the midthoracic area   Extremities: normal extremities, no  cyanosis edema,  contusions or wounds, no clubbing   Neurological: alert and oriented x3, intact senses,  motor, response and reflexes, normal strength   Breast: Bilateral   mastectomy, scar is healing, no subcutaneous nodule   Lymphatic: No significant lymphadenopathy   Psychological: Appropriate mood and behavior   Skin: Warm and dry, no lesions, no rashes         Lab Results:             /17/25) 1 yr ago  (2/16/24) 1 yr ago  (12/11/23) 1 yr ago  (10/25/23) 1 yr ago  (7/18/23) 1 yr ago  (6/27/23) 2 yr ago  (6/6/23)    WBC  4.4 - 11.3 x10*3/uL 6.6 6.0 6.2 4.5 5.7 R 7.1 R 7.5 R   RBC  4.00 - 5.20 x10*6/uL 4.66 5.04 4.93 4.85 3.67 Low  R 3.86 Low  R 4.24 R   Hemoglobin  12.0 - 16.0 g/dL 13.8 14.8 14.2 14.0 11.1 Low  11.5 Low  12.6   Hematocrit  36.0 - 46.0 % 42.3 46.4 High  42.7 42.3 34.2 Low  35.0 Low  38.0   MCV  80 - 100 fL 91 92 87 87 93 91 90   MCH  26.0 - 34.0 pg 29.6 29.4 28.8 28.9      MCHC  32.0 - 36.0 g/dL 32.6 31.9 Low  33.3 33.1 32.5 32.9 33.2   RDW  11.5 - 14.5 % 12.4 12.2 12.7 11.5 14.5 13.4 12.5   Platelets  150 - 450 x10*3/uL 251              Assessment and Plan:      Assessment and Plan:   Assessment:    1.  Invasive lobular carcinoma left breast, multifocal, maximum size 65 mm, ER 95%, [place] 95%, HER2 negative, associated with lobular carcinoma in situ, posterior anterior  margin positive, status post reexcision, 1 sentinel lymph node positive for micrometastatic disease      Stage Ib, pT3, pN1(omar)Mo, MammaPrint low risk , Oncotype DX score 13     Right mastectomy, no malignancy  BRCA 1 and 2 negative     Treatment, Cytoxan, Taxotere      C1, 5/17/23  C2 , 6/07/23   C3 ,6/28/23 7/19/23 , C4  Status post radiotherapy  Could not tolerate tamoxifen and aromatase inhibitor                  6/17/25     Patient has a history of left   breast cancer T3 N1 M0, MammaPrint low risk, Oncotype DX 13 status post 3 doses of adjuvant chemotherapy including Cytoxan and Taxotere which is well-tolerated.   Status post  adjuvant radiotherapy.    Could not tolerate tamoxifen, no aromatase inhibitor per patient decision       patient doing very, bone scan result discussed with the patient MRI of chest was done with did not show any left axillary mass discussed with patient.    No evidence of recurrent disease    Follow-up 6-month  Time spent 30 minutes

## 2025-08-22 ENCOUNTER — APPOINTMENT (OUTPATIENT)
Dept: SURGERY | Facility: CLINIC | Age: 45
End: 2025-08-22
Payer: COMMERCIAL

## 2025-08-22 VITALS
OXYGEN SATURATION: 98 % | HEART RATE: 64 BPM | BODY MASS INDEX: 23.52 KG/M2 | WEIGHT: 140 LBS | SYSTOLIC BLOOD PRESSURE: 147 MMHG | DIASTOLIC BLOOD PRESSURE: 84 MMHG

## 2025-08-22 DIAGNOSIS — R07.89 LEFT-SIDED CHEST WALL PAIN: ICD-10-CM

## 2025-08-22 DIAGNOSIS — Z13.71 BRCA NEGATIVE: ICD-10-CM

## 2025-08-22 DIAGNOSIS — C50.812 MALIGNANT NEOPLASM OF OVERLAPPING SITES OF LEFT BREAST IN FEMALE, ESTROGEN RECEPTOR POSITIVE: Primary | ICD-10-CM

## 2025-08-22 DIAGNOSIS — R22.32 MASS OF AXILLA, LEFT: ICD-10-CM

## 2025-08-22 DIAGNOSIS — Z17.0 MALIGNANT NEOPLASM OF OVERLAPPING SITES OF LEFT BREAST IN FEMALE, ESTROGEN RECEPTOR POSITIVE: Primary | ICD-10-CM

## 2025-08-22 PROCEDURE — 99213 OFFICE O/P EST LOW 20 MIN: CPT | Performed by: SURGERY

## 2026-08-28 ENCOUNTER — APPOINTMENT (OUTPATIENT)
Dept: SURGERY | Facility: CLINIC | Age: 46
End: 2026-08-28
Payer: COMMERCIAL